# Patient Record
Sex: FEMALE | Race: OTHER | Employment: UNEMPLOYED | URBAN - METROPOLITAN AREA
[De-identification: names, ages, dates, MRNs, and addresses within clinical notes are randomized per-mention and may not be internally consistent; named-entity substitution may affect disease eponyms.]

---
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
## 2022-07-21 ENCOUNTER — OFFICE VISIT (OUTPATIENT)
Dept: FAMILY MEDICINE CLINIC | Facility: CLINIC | Age: 22
End: 2022-07-21
Payer: COMMERCIAL

## 2022-07-21 VITALS
TEMPERATURE: 100 F | WEIGHT: 125 LBS | HEART RATE: 108 BPM | RESPIRATION RATE: 16 BRPM | HEIGHT: 61.02 IN | OXYGEN SATURATION: 98 % | BODY MASS INDEX: 23.6 KG/M2 | SYSTOLIC BLOOD PRESSURE: 118 MMHG | DIASTOLIC BLOOD PRESSURE: 70 MMHG

## 2022-07-21 DIAGNOSIS — Z11.8 SCREENING FOR CHLAMYDIAL DISEASE: ICD-10-CM

## 2022-07-21 DIAGNOSIS — Z83.438 FAMILY HISTORY OF HYPERLIPIDEMIA: ICD-10-CM

## 2022-07-21 DIAGNOSIS — Z30.09 BIRTH CONTROL COUNSELING: ICD-10-CM

## 2022-07-21 DIAGNOSIS — Z00.00 ANNUAL PHYSICAL EXAM: Primary | ICD-10-CM

## 2022-07-21 DIAGNOSIS — F32.A MILD DEPRESSION: ICD-10-CM

## 2022-07-21 PROBLEM — F43.21 ADJUSTMENT DISORDER WITH DEPRESSED MOOD: Status: RESOLVED | Noted: 2021-08-03 | Resolved: 2022-07-21

## 2022-07-21 PROCEDURE — 87591 N.GONORRHOEAE DNA AMP PROB: CPT | Performed by: FAMILY MEDICINE

## 2022-07-21 PROCEDURE — 87491 CHLMYD TRACH DNA AMP PROBE: CPT | Performed by: FAMILY MEDICINE

## 2022-07-21 PROCEDURE — 99395 PREV VISIT EST AGE 18-39: CPT | Performed by: FAMILY MEDICINE

## 2022-07-21 PROCEDURE — 3078F DIAST BP <80 MM HG: CPT | Performed by: FAMILY MEDICINE

## 2022-07-21 PROCEDURE — 3074F SYST BP LT 130 MM HG: CPT | Performed by: FAMILY MEDICINE

## 2022-07-21 PROCEDURE — 3008F BODY MASS INDEX DOCD: CPT | Performed by: FAMILY MEDICINE

## 2022-07-21 RX ORDER — DESOGESTREL AND ETHINYL ESTRADIOL 21-5 (28)
KIT ORAL
COMMUNITY
Start: 2022-02-13

## 2022-07-22 LAB
C TRACH DNA SPEC QL NAA+PROBE: NEGATIVE
N GONORRHOEA DNA SPEC QL NAA+PROBE: NEGATIVE

## 2022-07-27 ENCOUNTER — NURSE ONLY (OUTPATIENT)
Dept: FAMILY MEDICINE CLINIC | Facility: CLINIC | Age: 22
End: 2022-07-27
Payer: COMMERCIAL

## 2022-07-27 DIAGNOSIS — Z83.438 FAMILY HISTORY OF HYPERLIPIDEMIA: ICD-10-CM

## 2022-07-27 DIAGNOSIS — F32.A MILD DEPRESSION: ICD-10-CM

## 2022-07-27 LAB
CHOLEST SERPL-MCNC: 174 MG/DL (ref ?–200)
FASTING PATIENT LIPID ANSWER: YES
HDLC SERPL-MCNC: 65 MG/DL (ref 40–59)
LDLC SERPL CALC-MCNC: 93 MG/DL (ref ?–100)
NONHDLC SERPL-MCNC: 109 MG/DL (ref ?–130)
T4 FREE SERPL-MCNC: 1.2 NG/DL (ref 0.9–1.6)
TRIGL SERPL-MCNC: 86 MG/DL (ref 30–149)
TSI SER-ACNC: 2.05 MIU/ML (ref 0.36–3.74)
VLDLC SERPL CALC-MCNC: 14 MG/DL (ref 0–30)

## 2022-07-27 PROCEDURE — 84443 ASSAY THYROID STIM HORMONE: CPT | Performed by: FAMILY MEDICINE

## 2022-07-27 PROCEDURE — 80061 LIPID PANEL: CPT | Performed by: FAMILY MEDICINE

## 2022-07-27 PROCEDURE — 36415 COLL VENOUS BLD VENIPUNCTURE: CPT | Performed by: FAMILY MEDICINE

## 2022-07-27 PROCEDURE — 84439 ASSAY OF FREE THYROXINE: CPT | Performed by: FAMILY MEDICINE

## 2022-07-27 NOTE — PROGRESS NOTES
Patient came in for draw of ordered fasting labs. Patient drawn out of right AC, x 2 attempt and tolerated well.  1 gold (labeled ltgrn) tube drawn.      Unable to collect lav patient sent to reference lab for non-fasting lab

## 2022-08-11 ENCOUNTER — LAB ENCOUNTER (OUTPATIENT)
Dept: LAB | Age: 22
End: 2022-08-11
Attending: FAMILY MEDICINE
Payer: COMMERCIAL

## 2022-08-11 LAB
BASOPHILS # BLD AUTO: 0.05 X10(3) UL (ref 0–0.2)
BASOPHILS NFR BLD AUTO: 0.5 %
EOSINOPHIL # BLD AUTO: 0.25 X10(3) UL (ref 0–0.7)
EOSINOPHIL NFR BLD AUTO: 2.7 %
ERYTHROCYTE [DISTWIDTH] IN BLOOD BY AUTOMATED COUNT: 11.6 %
HCT VFR BLD AUTO: 39.9 %
HGB BLD-MCNC: 13.4 G/DL
IMM GRANULOCYTES # BLD AUTO: 0.01 X10(3) UL (ref 0–1)
IMM GRANULOCYTES NFR BLD: 0.1 %
LYMPHOCYTES # BLD AUTO: 3.19 X10(3) UL (ref 1.5–5)
LYMPHOCYTES NFR BLD AUTO: 34.4 %
MCH RBC QN AUTO: 31.2 PG (ref 26–34)
MCHC RBC AUTO-ENTMCNC: 33.6 G/DL (ref 31–37)
MCV RBC AUTO: 93 FL
MONOCYTES # BLD AUTO: 0.47 X10(3) UL (ref 0.1–1)
MONOCYTES NFR BLD AUTO: 5.1 %
NEUTROPHILS # BLD AUTO: 5.3 X10 (3) UL (ref 1.5–7.7)
NEUTROPHILS # BLD AUTO: 5.3 X10(3) UL (ref 1.5–7.7)
NEUTROPHILS NFR BLD AUTO: 57.2 %
PLATELET # BLD AUTO: 325 10(3)UL (ref 150–450)
RBC # BLD AUTO: 4.29 X10(6)UL
WBC # BLD AUTO: 9.3 X10(3) UL (ref 4–11)

## 2022-08-11 PROCEDURE — 85025 COMPLETE CBC W/AUTO DIFF WBC: CPT | Performed by: FAMILY MEDICINE

## 2023-08-01 ENCOUNTER — OFFICE VISIT (OUTPATIENT)
Dept: FAMILY MEDICINE CLINIC | Facility: CLINIC | Age: 23
End: 2023-08-01
Payer: COMMERCIAL

## 2023-08-01 VITALS
RESPIRATION RATE: 20 BRPM | DIASTOLIC BLOOD PRESSURE: 70 MMHG | SYSTOLIC BLOOD PRESSURE: 116 MMHG | BODY MASS INDEX: 25.65 KG/M2 | OXYGEN SATURATION: 100 % | WEIGHT: 132.38 LBS | TEMPERATURE: 97 F | HEIGHT: 60.43 IN | HEART RATE: 103 BPM

## 2023-08-01 DIAGNOSIS — Z30.41 ENCOUNTER FOR SURVEILLANCE OF CONTRACEPTIVE PILLS: ICD-10-CM

## 2023-08-01 DIAGNOSIS — Z00.00 ANNUAL PHYSICAL EXAM: Primary | ICD-10-CM

## 2023-08-01 DIAGNOSIS — Z11.8 SCREENING FOR CHLAMYDIAL DISEASE: ICD-10-CM

## 2023-08-01 PROBLEM — F32.A MILD DEPRESSION: Status: RESOLVED | Noted: 2022-07-21 | Resolved: 2023-08-01

## 2023-08-01 LAB
CONTROL LINE PRESENT WITH A CLEAR BACKGROUND (YES/NO): YES YES/NO
KIT LOT #: NORMAL NUMERIC
PREGNANCY TEST, URINE: NEGATIVE

## 2023-08-01 PROCEDURE — 99395 PREV VISIT EST AGE 18-39: CPT | Performed by: FAMILY MEDICINE

## 2023-08-01 PROCEDURE — 87491 CHLMYD TRACH DNA AMP PROBE: CPT | Performed by: FAMILY MEDICINE

## 2023-08-01 PROCEDURE — 3074F SYST BP LT 130 MM HG: CPT | Performed by: FAMILY MEDICINE

## 2023-08-01 PROCEDURE — 3008F BODY MASS INDEX DOCD: CPT | Performed by: FAMILY MEDICINE

## 2023-08-01 PROCEDURE — 81025 URINE PREGNANCY TEST: CPT | Performed by: FAMILY MEDICINE

## 2023-08-01 PROCEDURE — 87591 N.GONORRHOEAE DNA AMP PROB: CPT | Performed by: FAMILY MEDICINE

## 2023-08-01 PROCEDURE — 3078F DIAST BP <80 MM HG: CPT | Performed by: FAMILY MEDICINE

## 2023-08-01 RX ORDER — DESOGESTREL AND ETHINYL ESTRADIOL 21-5 (28)
1 KIT ORAL DAILY
Qty: 84 TABLET | Refills: 0 | COMMUNITY
Start: 2023-08-01

## 2023-08-02 LAB
C TRACH DNA SPEC QL NAA+PROBE: NEGATIVE
N GONORRHOEA DNA SPEC QL NAA+PROBE: NEGATIVE

## 2024-01-03 ENCOUNTER — MED REC SCAN ONLY (OUTPATIENT)
Dept: FAMILY MEDICINE CLINIC | Facility: CLINIC | Age: 24
End: 2024-01-03

## 2024-03-04 ENCOUNTER — OFFICE VISIT (OUTPATIENT)
Dept: FAMILY MEDICINE CLINIC | Facility: CLINIC | Age: 24
End: 2024-03-04
Payer: COMMERCIAL

## 2024-03-04 VITALS
DIASTOLIC BLOOD PRESSURE: 60 MMHG | WEIGHT: 144.63 LBS | RESPIRATION RATE: 20 BRPM | OXYGEN SATURATION: 99 % | TEMPERATURE: 97 F | BODY MASS INDEX: 28.39 KG/M2 | HEIGHT: 60 IN | HEART RATE: 91 BPM | SYSTOLIC BLOOD PRESSURE: 108 MMHG

## 2024-03-04 DIAGNOSIS — H61.21 IMPACTED CERUMEN OF RIGHT EAR: ICD-10-CM

## 2024-03-04 DIAGNOSIS — H60.541 ACUTE ECZEMATOID OTITIS EXTERNA OF RIGHT EAR: Primary | ICD-10-CM

## 2024-03-04 DIAGNOSIS — R51.9 NEW ONSET OF HEADACHES: ICD-10-CM

## 2024-03-04 RX ORDER — NEOMYCIN SULFATE, POLYMYXIN B SULFATE AND HYDROCORTISONE 10; 3.5; 1 MG/ML; MG/ML; [USP'U]/ML
3 SUSPENSION/ DROPS AURICULAR (OTIC) 3 TIMES DAILY
Qty: 10 ML | Refills: 0 | Status: SHIPPED | OUTPATIENT
Start: 2024-03-04

## 2024-03-04 RX ORDER — AMITRIPTYLINE HYDROCHLORIDE 25 MG/1
25 TABLET, FILM COATED ORAL NIGHTLY
Qty: 30 TABLET | Refills: 1 | Status: SHIPPED | OUTPATIENT
Start: 2024-03-04

## 2024-03-04 NOTE — PROGRESS NOTES
CHIEF COMPLAINT:   Chief Complaint   Patient presents with    Ear Pain     Patient c/o of right ear pain        HPI:     Adalgisa Duque is a 23 year old female hx of eczema presents for right ear discomfort and itching with slight decreased hearing x 2 months.  No drainage, denies dizziness.  Denies any nasal congestion or runny nose.  Denies any fever or chills.    Complains of left sided headache almost daily for 2 months.  Headaches are about a 3-4 out of 10 on the pain scale, described as a tightening feeling started in the temple area and go all the way back up to her head.  They can last 8 or 9 hours and resolve with sleep.  She does not take any medication for the headaches.  She denies any prodrome or scotoma or vision changes preceding the headache.  Denies any associated nausea or vomiting, photophobia or phonophobia.  Headaches are not waking her up at night.  She has not had these headaches before.  Denies family history of migraine.  Denies any nasal congestion, runny nose, weakness in the arms or legs, numbness tingling in the arms or legs.  No associated fevers.  Denies neck pain.  Headaches usually start in the afternoon sometimes go away on their own other times goes to bed and wakes up without a headache.  Does not drink caffeine or energy drinks with caffeine.  Does not drink soda, coffee or tea.      HISTORY:  Past Medical History:   Diagnosis Date    Patient denies medical problems       Past Surgical History:   Procedure Laterality Date    ORAL SURGERY PROCEDURE      widsom tooth 06/2021       Family History   Problem Relation Age of Onset    Thyroid disease Mother     Asthma Father     Diabetes Maternal Grandmother     Glaucoma Maternal Grandmother     Hypertension Maternal Grandmother     Diabetes Paternal Grandmother     Diabetes Paternal Grandfather       Social History:   Social History     Socioeconomic History    Marital status: Single   Tobacco Use    Smoking status: Never     Smokeless tobacco: Never   Vaping Use    Vaping Use: Never used   Substance and Sexual Activity    Alcohol use: Yes     Comment: Drinks probably less than once a month    Drug use: Never   Other Topics Concern    Caffeine Concern No    Exercise No    Seat Belt No    Special Diet No    Stress Concern No    Weight Concern No        Medications (Active prior to today's visit):  Current Outpatient Medications   Medication Sig Dispense Refill    amitriptyline 25 MG Oral Tab Take 1 tablet (25 mg total) by mouth nightly. 30 tablet 1    neomycin-polymyxin-hydrocortisone 3.5-14536-7 Otic Suspension Place 3 drops into the right ear 3 (three) times daily. 10 mL 0    Desogestrel-Ethinyl Estradiol 0.15-0.02/0.01 MG (21/5) Oral Tab Take 1 tablet by mouth daily. 84 tablet 0    Multiple Vitamins-Minerals (CENTRUM) Oral Tab Take 1 tablet by mouth daily.         Allergies:  Allergies   Allergen Reactions    [place] ITCHING, PAIN, RASH and SWELLING       PSFH elements reviewed from today and agreed except as otherwise stated in HPI.  PHYSICAL EXAM:   /60 (BP Location: Right arm, Patient Position: Sitting, Cuff Size: adult)   Pulse 91   Temp 97.2 °F (36.2 °C) (Temporal)   Resp 20   Ht 5' (1.524 m)   Wt 144 lb 9.6 oz (65.6 kg)   LMP 02/09/2024 (Exact Date)   SpO2 99%   BMI 28.24 kg/m²   Vital signs reviewed.Appears stated age, well groomed.  Physical Exam     GENERAL: well developed, well nourished,in no apparent distress  EYES; PERRLA, EOM-i B/L. Sclera clear and non icteric bilateral  SKIN: no rashes,no suspicious lesions  HEENT: atraumatic, normocephalic, no papilledema bilateral, normal funduscopic exam, normal red reflex, PERRL EOMI bilateral.  Left ear TM is intact and clear, right ear is occluded by cerumen which is very dry.  NECK: supple,no adenopathy,no bruits, no JVD  LUNGS: clear to auscultation bilateral. No RRW. Good inspiratory and expiratory effort  CARDIO: RRR without murmur, clear S1, S2  VS: no  carotid artery bruit bilateral.    GI: good BS's,no masses,No HSM or tenderness.   EXTREMITIES: no cyanosis, clubbing or edema, bilateral. No calf tenderness  NEURO: no focal deficits. AOx3.PERRL EOMI bilateral. CN II-XII grossly intact. NL gait. Normal u/LE bilateral DTR +2/4 and symmetric. Normal sensation to touch intact.Normal finger to nose test. Normal muscle strength +5/5 b/l and equal and symmetric of upper/lower extremity. No pronator drift.     EAR WAX REMOVAL  Lighted curette was used to remove earwax from the right ear.  Post earwax removal posterior and inferior wall of the EAC was very dry, erythematous with some cracking and the EAC opening had dry scaly skin.  Patient tolerated procedure well and denied pain  right cerumen impaction resolved- affected TM intact and clear      LABS        REVIEWED THIS VISIT  ASSESSMENT/PLAN:   23 year old female with    1. Acute eczematoid otitis externa of right ear  - neomycin-polymyxin-hydrocortisone 3.5-43001-8 Otic Suspension; Place 3 drops into the right ear 3 (three) times daily.  Dispense: 10 mL; Refill: 0  Patient prone to eczema  At risk of outer ear infection with flare in the EAC   Topical steroid and antibiotic drops 3 times daily for a week  Patient is to keep the ear dry and shower wear a cotton ball    2. Impacted cerumen of right ear  Resolved with curette    3. New onset of headaches  - start amitriptyline 25 MG Oral Tab; Take 1 tablet (25 mg total) by mouth nightly.  Dispense: 30 tablet; Refill: 1  Neuroexam is normal  Headaches x 2 months  Suspect chronic daily headache  Has good sleep hygiene, not abusing or using any caffeine, onset taking any NSAIDs to have rebound headaches.  Start with low-dose amitriptyline-if 25 mg is too sedating may take half a tablet.  Risks and benefits of medication amitriptyline discussed including headache, bloating, constipation, dry mouth, hypertension, heart arrhythmia, dizziness, nausea, urinary retention, weight  gain feeling fatigued or sedated on medication,-can have hangover effect, constipation, dizziness, weight gain, developing depression, severe depression,, not feeling like oneself etc.  No driving or operating machinery for 8 hours after ingestion.  Black box warning of becoming suicidal related to medications discussed.  Call office immediately if any moderate to severe side effects or concerns.  Encourage patient to call office if any concerns or questions with the medication or ear issues  Recheck in 1 month if headaches are not resolving or if are worsening or if neuroexam changes then needs MRI of the brain.  Follow-up closely      Meds This Visit:  Requested Prescriptions     Signed Prescriptions Disp Refills    amitriptyline 25 MG Oral Tab 30 tablet 1     Sig: Take 1 tablet (25 mg total) by mouth nightly.    neomycin-polymyxin-hydrocortisone 3.5-86859-1 Otic Suspension 10 mL 0     Sig: Place 3 drops into the right ear 3 (three) times daily.       Health Maintenance:  Health Maintenance   Topic Date Due    Chlamydia Screening  08/01/2024    Annual Physical  08/01/2024    Pap Smear  12/19/2026    DTaP,Tdap,and Td Vaccines (8 - Td or Tdap) 08/03/2031    Influenza Vaccine  Completed    Annual Depression Screening  Completed    Hepatitis B Vaccines  Completed    Hepatitis A Vaccines  Completed    MMR Vaccines  Completed    Varicella Vaccines  Completed    Meningococcal Vaccine  Completed    HPV Vaccines  Completed    COVID-19 Vaccine  Completed    Pneumococcal Vaccine: Birth to 64yrs  Aged Out         Patient/Caregiver Education: Patient/Caregiver Education: There are no barriers to learning. Medical education done.   Outcome: Patient verbalizes understanding. Patient is notified to call with any questions, comp lications, allergies, or worsening or changing symptoms.  Patient is to call with any side effects or complications from the treatments as a result of today.     Problem List:     Patient Active Problem  List   Diagnosis    Intrinsic atopic dermatitis    Family history of breast cancer    Immunity status testing    Family history of hyperlipidemia    BMI 25.0-25.9,adult    Impacted cerumen of right ear    Acute eczematoid otitis externa of right ear    New onset of headaches       Imaging & Referrals:  None     3/4/2024  Deyanira Somers, DO      Patient understands plan and follow-up.  Return in about 1 month (around 4/4/2024) for recheck symptoms, sooner if needed..

## 2024-03-27 DIAGNOSIS — R51.9 NEW ONSET OF HEADACHES: ICD-10-CM

## 2024-03-27 RX ORDER — AMITRIPTYLINE HYDROCHLORIDE 25 MG/1
25 TABLET, FILM COATED ORAL NIGHTLY
Qty: 90 TABLET | Refills: 1 | OUTPATIENT
Start: 2024-03-27

## 2024-04-05 ENCOUNTER — OFFICE VISIT (OUTPATIENT)
Dept: FAMILY MEDICINE CLINIC | Facility: CLINIC | Age: 24
End: 2024-04-05
Payer: COMMERCIAL

## 2024-04-05 VITALS
BODY MASS INDEX: 29.02 KG/M2 | HEART RATE: 105 BPM | DIASTOLIC BLOOD PRESSURE: 70 MMHG | HEIGHT: 60 IN | WEIGHT: 147.81 LBS | SYSTOLIC BLOOD PRESSURE: 100 MMHG | TEMPERATURE: 97 F | OXYGEN SATURATION: 99 % | RESPIRATION RATE: 20 BRPM

## 2024-04-05 DIAGNOSIS — F41.9 ANXIETY AND DEPRESSION: ICD-10-CM

## 2024-04-05 DIAGNOSIS — R51.9 NEW ONSET HEADACHE: Primary | ICD-10-CM

## 2024-04-05 DIAGNOSIS — N91.2 AMENORRHEA: ICD-10-CM

## 2024-04-05 DIAGNOSIS — F32.A ANXIETY AND DEPRESSION: ICD-10-CM

## 2024-04-05 DIAGNOSIS — F43.9 STRESS: ICD-10-CM

## 2024-04-05 PROCEDURE — 81025 URINE PREGNANCY TEST: CPT | Performed by: FAMILY MEDICINE

## 2024-04-05 PROCEDURE — 3078F DIAST BP <80 MM HG: CPT | Performed by: FAMILY MEDICINE

## 2024-04-05 PROCEDURE — 99214 OFFICE O/P EST MOD 30 MIN: CPT | Performed by: FAMILY MEDICINE

## 2024-04-05 PROCEDURE — 3074F SYST BP LT 130 MM HG: CPT | Performed by: FAMILY MEDICINE

## 2024-04-05 PROCEDURE — 3008F BODY MASS INDEX DOCD: CPT | Performed by: FAMILY MEDICINE

## 2024-04-05 RX ORDER — AMITRIPTYLINE HYDROCHLORIDE 25 MG/1
25 TABLET, FILM COATED ORAL NIGHTLY
Qty: 30 TABLET | Refills: 5 | Status: SHIPPED | OUTPATIENT
Start: 2024-04-05

## 2024-04-05 NOTE — PROGRESS NOTES
CHIEF COMPLAINT:   Chief Complaint   Patient presents with    Medication Follow-Up       HPI:     Adalgisa Duque is a 23 year old female follow-up new onset headaches daily to every other day.  Last visit patient was started on amitriptyline 25 mg at bedtime.  She states after the visit she was not in school and then did not have a headache for 2 weeks but when she restarted the semester she started having headaches every day to every other day.  She started taking amitriptyline and after 3 days the headaches resolved.  She had another headache and 1 day that she forgot to take the amitriptyline and she is only had 1 headache after taking it consistently.  States it was mild did not need any medication.  She does have some sedation but sleeps well with the medication.  Denies any other medication side effects.  Denies weakness or numbness of her arms or legs.  Denies any gait disturbances, dizziness or vision changes.    Has history of depression and anxiety-seeing counselor weekly to every 2 weeks.  Does not want to be on medication.  She feels it is due to her stress and circumstances.  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  She does feel down and sad several days a week.  She feels that she is managing it.  Her appetite is normal.  Sleep is unaffected.  She denies any SI or HI or wanting to die.  She is hopeful but scared due to the pressure she is under.  Feels angry at times that she has no time for herself.    Amenorrhea-no menses for 2 months on OCPs.  Not missed any doses of ACP Home pregnancy test have been negative.  Patient was concerned if she should have a menstrual cycle.  OCPs are filled by a practitioner at her school.    HPI 3/4/2024  hx of eczema presents for right ear discomfort and itching with slight decreased hearing x 2 months.  No drainage, denies dizziness.  Denies any nasal  congestion or runny nose.  Denies any fever or chills.    Complains of left sided headache almost daily for 2 months.  Headaches are about a 3-4 out of 10 on the pain scale, described as a tightening feeling started in the temple area and go all the way back up to her head.  They can last 8 or 9 hours and resolve with sleep.  She does not take any medication for the headaches.  She denies any prodrome or scotoma or vision changes preceding the headache.  Denies any associated nausea or vomiting, photophobia or phonophobia.  Headaches are not waking her up at night.  She has not had these headaches before.  Denies family history of migraine.  Denies any nasal congestion, runny nose, weakness in the arms or legs, numbness tingling in the arms or legs.  No associated fevers.  Denies neck pain.  Headaches usually start in the afternoon sometimes go away on their own other times goes to bed and wakes up without a headache.  Does not drink caffeine or energy drinks with caffeine.  Does not drink soda, coffee or tea.      HISTORY:  Past Medical History:   Diagnosis Date    Patient denies medical problems       Past Surgical History:   Procedure Laterality Date    ORAL SURGERY PROCEDURE      widsom tooth 06/2021       Family History   Problem Relation Age of Onset    Thyroid disease Mother     Asthma Father     Diabetes Maternal Grandmother     Glaucoma Maternal Grandmother     Hypertension Maternal Grandmother     Diabetes Paternal Grandmother     Diabetes Paternal Grandfather       Social History:   Social History     Socioeconomic History    Marital status: Single   Tobacco Use    Smoking status: Never    Smokeless tobacco: Never   Vaping Use    Vaping Use: Never used   Substance and Sexual Activity    Alcohol use: Yes     Comment: Drinks probably less than once a month    Drug use: Never   Other Topics Concern    Caffeine Concern No    Exercise No    Seat Belt No    Special Diet No    Stress Concern No    Weight Concern  No        Medications (Active prior to today's visit):  Current Outpatient Medications   Medication Sig Dispense Refill    amitriptyline 25 MG Oral Tab Take 1 tablet (25 mg total) by mouth nightly. 30 tablet 1    neomycin-polymyxin-hydrocortisone 3.5-81040-7 Otic Suspension Place 3 drops into the right ear 3 (three) times daily. 10 mL 0    Desogestrel-Ethinyl Estradiol 0.15-0.02/0.01 MG (21/5) Oral Tab Take 1 tablet by mouth daily. 84 tablet 0    Multiple Vitamins-Minerals (CENTRUM) Oral Tab Take 1 tablet by mouth daily.         Allergies:  Allergies   Allergen Reactions    [place] ITCHING, PAIN, RASH and SWELLING       PSFH elements reviewed from today and agreed except as otherwise stated in HPI.  PHYSICAL EXAM:   /70 (BP Location: Left arm, Patient Position: Sitting, Cuff Size: adult)   Pulse 105   Temp 97 °F (36.1 °C) (Temporal)   Resp 20   Ht 5' (1.524 m)   Wt 147 lb 12.8 oz (67 kg)   LMP 02/09/2024 (Exact Date)   SpO2 99%   BMI 28.87 kg/m²   Vital signs reviewed.Appears stated age, well groomed.  Physical Exam   GENERAL: well developed, well nourished,in no apparent distress  EYES; PERRLA, EOM-i B/L. Sclera clear and non icteric bilateral  SKIN: no rashes,no suspicious lesions  HEENT: atraumatic, normocephalic, no papilledema bilateral, normal funduscopic exam, normal red reflex, PERRL EOMI bilateral.    NECK: supple,no adenopathy,no bruits, no JVD  LUNGS: clear to auscultation bilateral. No RRW. Good inspiratory and expiratory effort  CARDIO: RRR without murmur, clear S1, S2  VS: no carotid artery bruit bilateral.    GI: good BS's,no masses,No HSM or tenderness.   EXTREMITIES: no cyanosis, clubbing or edema, bilateral. No calf tenderness  NEURO: no focal deficits. AOx3.PERRL EOMI bilateral. CN II-XII grossly intact. NL gait. Normal u/LE bilateral DTR +2/4 and symmetric. Normal sensation to touch intact.Normal finger to nose test. Normal muscle strength +5/5 b/l and equal and symmetric of  upper/lower extremity. No pronator drift.   Psyche: normal affect.  Good eye contact.  Well-groomed.  no tearfulness and crying.  Normal speech.  Appropriate insight.  Denies suicidal or homicidal ideation.       LABS        REVIEWED THIS VISIT  ASSESSMENT/PLAN:   23 year old female with    1. New onset headache  - z Insight MRI BRAIN (CPT=70551); Future  - z Insight MRI BRAIN (CPT=70551)  Appears to be responding to amitriptyline neuroexam is normal.  Suspect chronic daily headache and tension due to stress  Will rule out any other etiology since now with amenorrhea  Continue amitriptyline  Follow-up in June 2024 off medication to see if headaches return    2. Stress  - amitriptyline 25 MG Oral Tab; Take 1 tablet (25 mg total) by mouth nightly.  Dispense: 30 tablet; Refill: 5  Discussed stress management  Take at least 1 evening off a week to rest or do something she enjoys  Continue getting adequate rest  Walk 25 minutes outside daily will improve mood    3. Amenorrhea  - Urine Preg Test-negative  - z Insight MRI BRAIN (CPT=70551); Future  - z Insight MRI BRAIN (CPT=70551)  Stop OCPs and within a few's weeks to months will have it menstrual cycle but patient declined  Reassured patient not serious and patient does not have a menstrual cycle as long as she is not pregnant    4. Anxiety and depression  Continue psychotherapy  Feels able to manage her studies and responsibilities and mood is not affecting her quality of life  Understands if symptoms worsen or change she can always return to the office to discuss antidepressants.  Patient is on a very low-dose of amitriptyline which may have some antidepressant effect but may not be sufficient if symptoms worsen  -contracts for safety- if suicidal or homicidal, call 911 or go to nearest ER and call office  -increased suicide risk on amitriptyline  Exercise 3 x weekly x 30-60min  Recheck in June 2024-2 and half months      Meds This Visit:  Requested Prescriptions      Signed Prescriptions Disp Refills    amitriptyline 25 MG Oral Tab 30 tablet 5     Sig: Take 1 tablet (25 mg total) by mouth nightly.       Health Maintenance:  Health Maintenance   Topic Date Due    Chlamydia Screening  08/01/2024    Annual Physical  08/01/2024    Pap Smear  12/19/2026    DTaP,Tdap,and Td Vaccines (8 - Td or Tdap) 08/03/2031    Influenza Vaccine  Completed    Annual Depression Screening  Completed    Hepatitis B Vaccines  Completed    Hepatitis A Vaccines  Completed    MMR Vaccines  Completed    Varicella Vaccines  Completed    Meningococcal Vaccine  Completed    HPV Vaccines  Completed    COVID-19 Vaccine  Completed    Pneumococcal Vaccine: Birth to 64yrs  Aged Out         Patient/Caregiver Education: Patient/Caregiver Education: There are no barriers to learning. Medical education done.   Outcome: Patient verbalizes understanding. Patient is notified to call with any questions, comp lications, allergies, or worsening or changing symptoms.  Patient is to call with any side effects or complications from the treatments as a result of today.     Problem List:     Patient Active Problem List   Diagnosis    Intrinsic atopic dermatitis    Family history of breast cancer    Immunity status testing    Family history of hyperlipidemia    BMI 25.0-25.9,adult    Impacted cerumen of right ear    Acute eczematoid otitis externa of right ear    New onset of headaches       Imaging & Referrals:  None     3/4/2024  Deyanira Somers, DO      Patient understands plan and follow-up.  Return in about 2 months (around 6/18/2024) for recheck headaches. stop medication after finals in May 2024 sooner if worse.

## 2024-06-18 ENCOUNTER — OFFICE VISIT (OUTPATIENT)
Dept: FAMILY MEDICINE CLINIC | Facility: CLINIC | Age: 24
End: 2024-06-18

## 2024-06-18 VITALS
HEART RATE: 93 BPM | OXYGEN SATURATION: 99 % | RESPIRATION RATE: 22 BRPM | BODY MASS INDEX: 28.54 KG/M2 | WEIGHT: 145.38 LBS | SYSTOLIC BLOOD PRESSURE: 100 MMHG | TEMPERATURE: 97 F | HEIGHT: 60 IN | DIASTOLIC BLOOD PRESSURE: 70 MMHG

## 2024-06-18 DIAGNOSIS — G44.209 TENSION HEADACHE: Primary | ICD-10-CM

## 2024-06-18 PROCEDURE — 3008F BODY MASS INDEX DOCD: CPT | Performed by: FAMILY MEDICINE

## 2024-06-18 PROCEDURE — 99212 OFFICE O/P EST SF 10 MIN: CPT | Performed by: FAMILY MEDICINE

## 2024-06-18 PROCEDURE — 3078F DIAST BP <80 MM HG: CPT | Performed by: FAMILY MEDICINE

## 2024-06-18 PROCEDURE — 3074F SYST BP LT 130 MM HG: CPT | Performed by: FAMILY MEDICINE

## 2024-06-18 NOTE — PROGRESS NOTES
CHIEF COMPLAINT:   Chief Complaint   Patient presents with    Medication Follow-Up       HPI:     Adalgisa Duque is a 23 year old female presents for medication monitoring.  Patient self discontinued amitriptyline.  States finished school did not feel she needed it.  She has only had 4 headaches a month or less since school finished.  Last headache was a week ago.  States pressure at the top of the head and the left side of the head.  Relieved without any intervention or will sometimes use over-the-counter ibuprofen.  She denies feeling depressed, sad or hopeless.  Has therapist at school but is currently not in psychotherapy.  Working on research this summer.  Denies any associated vision changes, aura, nausea, vomiting, photophobia, phonophobia or severe headaches.  MRI was performed due to frequent daily headaches on 4/19/2024 and was normal.  Patient does not feel she needs any intervention at this time would like to be monitored or observed.    On OCP.  Menses is now regular.      HPI 4/5/2024  female follow-up new onset headaches daily to every other day.  Last visit patient was started on amitriptyline 25 mg at bedtime.  She states after the visit she was not in school and then did not have a headache for 2 weeks but when she restarted the semester she started having headaches every day to every other day.  She started taking amitriptyline and after 3 days the headaches resolved.  She had another headache and 1 day that she forgot to take the amitriptyline and she is only had 1 headache after taking it consistently.  States it was mild did not need any medication.  She does have some sedation but sleeps well with the medication.  Denies any other medication side effects.  Denies weakness or numbness of her arms or legs.  Denies any gait disturbances, dizziness or vision changes.    Has history of depression and anxiety-seeing counselor weekly to every 2 weeks.  Does not want to be on medication.   She feels it is due to her stress and circumstances.  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  She does feel down and sad several days a week.  She feels that she is managing it.  Her appetite is normal.  Sleep is unaffected.  She denies any SI or HI or wanting to die.  She is hopeful but scared due to the pressure she is under.  Feels angry at times that she has no time for herself.    Amenorrhea-no menses for 2 months on OCPs.  Not missed any doses of ACP Home pregnancy test have been negative.  Patient was concerned if she should have a menstrual cycle.  OCPs are filled by a practitioner at her school.    HPI 3/4/2024  hx of eczema presents for right ear discomfort and itching with slight decreased hearing x 2 months.  No drainage, denies dizziness.  Denies any nasal congestion or runny nose.  Denies any fever or chills.    Complains of left sided headache almost daily for 2 months.  Headaches are about a 3-4 out of 10 on the pain scale, described as a tightening feeling started in the temple area and go all the way back up to her head.  They can last 8 or 9 hours and resolve with sleep.  She does not take any medication for the headaches.  She denies any prodrome or scotoma or vision changes preceding the headache.  Denies any associated nausea or vomiting, photophobia or phonophobia.  Headaches are not waking her up at night.  She has not had these headaches before.  Denies family history of migraine.  Denies any nasal congestion, runny nose, weakness in the arms or legs, numbness tingling in the arms or legs.  No associated fevers.  Denies neck pain.  Headaches usually start in the afternoon sometimes go away on their own other times goes to bed and wakes up without a headache.  Does not drink caffeine or energy drinks with caffeine.  Does not drink soda, coffee or tea.      HISTORY:  Past Medical History:     Patient denies medical problems      Past Surgical History:   Procedure Laterality Date    Oral surgery procedure      widsom tooth 06/2021       Family History   Problem Relation Age of Onset    Thyroid disease Mother     Asthma Father     Diabetes Maternal Grandmother     Glaucoma Maternal Grandmother     Hypertension Maternal Grandmother     Diabetes Paternal Grandmother     Diabetes Paternal Grandfather       Social History:   Social History     Socioeconomic History    Marital status: Single   Tobacco Use    Smoking status: Never    Smokeless tobacco: Never   Vaping Use    Vaping status: Never Used   Substance and Sexual Activity    Alcohol use: Yes     Comment: Drinks probably less than once a month    Drug use: Never   Other Topics Concern    Caffeine Concern No    Exercise No    Seat Belt No    Special Diet No    Stress Concern No    Weight Concern No        Medications (Active prior to today's visit):  Current Outpatient Medications   Medication Sig Dispense Refill    amitriptyline 25 MG Oral Tab Take 1 tablet (25 mg total) by mouth nightly. 30 tablet 5    neomycin-polymyxin-hydrocortisone 3.5-30093-4 Otic Suspension Place 3 drops into the right ear 3 (three) times daily. 10 mL 0    Desogestrel-Ethinyl Estradiol 0.15-0.02/0.01 MG (21/5) Oral Tab Take 1 tablet by mouth daily. 84 tablet 0    Multiple Vitamins-Minerals (CENTRUM) Oral Tab Take 1 tablet by mouth daily.         Allergies:  Allergies   Allergen Reactions    [place] ITCHING, PAIN, RASH and SWELLING       PSFH elements reviewed from today and agreed except as otherwise stated in HPI.  PHYSICAL EXAM:   /70 (BP Location: Left arm, Patient Position: Sitting, Cuff Size: adult)   Pulse 93   Temp 97.2 °F (36.2 °C) (Temporal)   Resp 22   Ht 5' (1.524 m)   Wt 145 lb 6.4 oz (66 kg)   LMP 05/22/2024 (Exact Date)   SpO2 99%   BMI 28.40 kg/m²   Vital signs reviewed.Appears stated age, well groomed.  Physical Exam   GENERAL: well developed, well  nourished,in no apparent distress  EYES; PERRLA, EOM-i B/L. Sclera clear and non icteric bilateral  SKIN: no rashes,no suspicious lesions  HEENT: atraumatic, normocephalic, no papilledema bilateral, normal funduscopic exam, normal red reflex, PERRL EOMI bilateral.    LUNGS: clear to auscultation bilateral. No RRW. Good inspiratory and expiratory effort  CARDIO: RRR without murmur, clear S1, S2  EXTREMITIES: no cyanosis, clubbing or edema, bilateral. No calf tenderness  NEURO: no focal deficits. AOx3.PERRL EOMI bilateral. CN II-XII grossly intact. NL gait. Normal u/LE bilateral DTR +2/4 and symmetric. Normal sensation to touch intact.Normal finger to nose test. Normal muscle strength +5/5 b/l and equal and symmetric of upper/lower extremity. No pronator drift.   Psyche: normal affect.  Good eye contact.  Well-groomed.  no tearfulness and crying.  Normal speech.  Appropriate insight.  Denies suicidal or homicidal ideation.       LABS        Exam: MRI BRAIN W/O CONTRAST   CPT Code(s): 45966 - MRI BRAIN STEM W/O DYE     CLINICAL HISTORY: New onset headache (R 51.9). Amenorrhea (N 91.2).     COMPARISON: None.     TECHNIQUE: Brain MRI without contrast. Exam performed on an ultra high field 3.0 Anca MR scanner.     FINDINGS: The cerebral hemispheres, cerebellum, and brainstem are normal in morphology and signal. Normal morphology of the corpus callosum. No areas of restricted diffusion are identified. No evidence of edema, infarct, hemorrhage, mass, or abnormal   extra-axial fluid collection. Ventricular volumes are normal. No midline shift. The major intracranial arterial flow voids appear patent. Normal size of the pituitary gland. No Chiari malformation.     The paranasal sinuses and mastoid air cells are normally aerated. No bone lesions are identified.     IMPRESSION: Normal non-contrast brain MRI.     Interpreting Radiologist:     Sanjay Alvarenga M.D.   Electronically Signed: 04/19/2024 04:34 PM   REVIEWED THIS  VISIT  ASSESSMENT/PLAN:   23 year old female with    1. Tension headache  Off amitriptyline  Only a few mild headaches a month and if 1 occurs relieved with ibuprofen if more moderate  Neuroexam is normal  MRI of the brain is normal  Suspect headaches are triggered by stress possibly anxiety   If headache symptoms return when school restarts in the fall consider either restarting amitriptyline or considering an SSRI.    Meds This Visit:  Requested Prescriptions      No prescriptions requested or ordered in this encounter       Health Maintenance:  Health Maintenance   Topic Date Due    Chlamydia Screening  08/01/2024    Annual Physical  08/01/2024    Pap Smear  12/19/2026    DTaP,Tdap,and Td Vaccines (8 - Td or Tdap) 08/03/2031    Influenza Vaccine  Completed    Annual Depression Screening  Completed    Hepatitis B Vaccines  Completed    Hepatitis A Vaccines  Completed    MMR Vaccines  Completed    Varicella Vaccines  Completed    Meningococcal Vaccine  Completed    HPV Vaccines  Completed    COVID-19 Vaccine  Completed    Pneumococcal Vaccine: Birth to 64yrs  Aged Out         Patient/Caregiver Education: Patient/Caregiver Education: There are no barriers to learning. Medical education done.   Outcome: Patient verbalizes understanding. Patient is notified to call with any questions, comp lications, allergies, or worsening or changing symptoms.  Patient is to call with any side effects or complications from the treatments as a result of today.     Problem List:     Patient Active Problem List   Diagnosis    Intrinsic atopic dermatitis    Family history of breast cancer    Immunity status testing    Family history of hyperlipidemia    BMI 25.0-25.9,adult    Impacted cerumen of right ear    Acute eczematoid otitis externa of right ear    New onset headache    Stress    Amenorrhea    Anxiety and depression       Imaging & Referrals:  None     3/4/2024  Deyanira Somers, DO      Patient understands plan and  follow-up.  Return in about 8 weeks (around 8/13/2024) for annual physical, fasting labs AM, recheck symptoms.

## 2024-12-19 ENCOUNTER — OFFICE VISIT (OUTPATIENT)
Dept: FAMILY MEDICINE CLINIC | Facility: CLINIC | Age: 24
End: 2024-12-19
Payer: COMMERCIAL

## 2024-12-19 VITALS
WEIGHT: 145.63 LBS | BODY MASS INDEX: 26.8 KG/M2 | SYSTOLIC BLOOD PRESSURE: 108 MMHG | TEMPERATURE: 97 F | HEIGHT: 61.7 IN | OXYGEN SATURATION: 99 % | DIASTOLIC BLOOD PRESSURE: 64 MMHG | RESPIRATION RATE: 18 BRPM | HEART RATE: 86 BPM

## 2024-12-19 DIAGNOSIS — Z13.21 SCREENING FOR ENDOCRINE, NUTRITIONAL, METABOLIC AND IMMUNITY DISORDER: ICD-10-CM

## 2024-12-19 DIAGNOSIS — Z30.430 ENCOUNTER FOR INSERTION OF SKYLA IUD: ICD-10-CM

## 2024-12-19 DIAGNOSIS — F32.A MILD DEPRESSION: ICD-10-CM

## 2024-12-19 DIAGNOSIS — F41.9 ANXIETY: ICD-10-CM

## 2024-12-19 DIAGNOSIS — Z11.8 SCREENING FOR CHLAMYDIAL DISEASE: ICD-10-CM

## 2024-12-19 DIAGNOSIS — Z30.41 ENCOUNTER FOR SURVEILLANCE OF CONTRACEPTIVE PILLS: ICD-10-CM

## 2024-12-19 DIAGNOSIS — Z13.228 SCREENING FOR ENDOCRINE, NUTRITIONAL, METABOLIC AND IMMUNITY DISORDER: ICD-10-CM

## 2024-12-19 DIAGNOSIS — Z13.0 SCREENING FOR ENDOCRINE, NUTRITIONAL, METABOLIC AND IMMUNITY DISORDER: ICD-10-CM

## 2024-12-19 DIAGNOSIS — Z00.00 ANNUAL PHYSICAL EXAM: Primary | ICD-10-CM

## 2024-12-19 DIAGNOSIS — Z13.29 SCREENING FOR ENDOCRINE, NUTRITIONAL, METABOLIC AND IMMUNITY DISORDER: ICD-10-CM

## 2024-12-19 DIAGNOSIS — R45.851 SUICIDAL IDEATION: ICD-10-CM

## 2024-12-19 PROBLEM — N91.2 AMENORRHEA: Status: RESOLVED | Noted: 2024-04-05 | Resolved: 2024-12-19

## 2024-12-19 PROCEDURE — 87491 CHLMYD TRACH DNA AMP PROBE: CPT | Performed by: FAMILY MEDICINE

## 2024-12-19 PROCEDURE — 87591 N.GONORRHOEAE DNA AMP PROB: CPT | Performed by: FAMILY MEDICINE

## 2024-12-19 RX ORDER — TACROLIMUS 1 MG/G
1 OINTMENT TOPICAL
COMMUNITY
Start: 2024-08-21

## 2024-12-19 RX ORDER — ALCLOMETASONE DIPROPIONATE 0.5 MG/G
1 OINTMENT TOPICAL
COMMUNITY
Start: 2024-08-21

## 2024-12-19 RX ORDER — NORGESTIMATE AND ETHINYL ESTRADIOL 0.25-0.035
1 KIT ORAL DAILY
Qty: 84 TABLET | Refills: 3 | Status: SHIPPED | OUTPATIENT
Start: 2024-12-19 | End: 2024-12-19

## 2024-12-19 RX ORDER — NORGESTIMATE AND ETHINYL ESTRADIOL 0.25-0.035
1 KIT ORAL DAILY
Qty: 84 TABLET | Refills: 3 | Status: SHIPPED
Start: 2024-12-19 | End: 2024-12-19

## 2024-12-19 RX ORDER — NORGESTIMATE AND ETHINYL ESTRADIOL 0.25-0.035
1 KIT ORAL DAILY
Qty: 84 TABLET | Refills: 3 | Status: SHIPPED
Start: 2024-12-19

## 2024-12-19 RX ORDER — NORGESTIMATE AND ETHINYL ESTRADIOL 0.25-0.035
1 KIT ORAL DAILY
COMMUNITY
End: 2024-12-19

## 2024-12-19 RX ORDER — KETOCONAZOLE 20 MG/G
CREAM TOPICAL
COMMUNITY
Start: 2024-08-21

## 2024-12-19 RX ORDER — CLOBETASOL PROPIONATE 0.5 MG/ML
SOLUTION TOPICAL
COMMUNITY
Start: 2024-08-21

## 2024-12-19 NOTE — PROGRESS NOTES
REASON FOR VISIT:    Adalgisa Duque is a 24 year old female who presents for an Annual Health Assessment.    No complaints-     Studying history- applying for law school/prelaw. Completed [employer]-grade ok. 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 Wants to leave [place]  Feeling depressed just finished finals and feels due to stress. Felt depressed at times and thought of dying to end stress, no plan. Denies feeling depressed or sad several days of weeks. States this semester academically difficult. Sleeping well, eating well. No relationship issues with family or friends. Feels well adjusted.  Admits some irritable and feels anxious- feels not interfering with life. Stress relief- crochets and plays video games- still desire to do perform activities.  Able to self groom when needed. Has therapist- needs to schedule.      Often forgets to take ocp then worries about pregnancy. Went 2 months of ocp and felt mood was better.  Tried to have an [facility]UD [facility] at [facility] in 1 place that tenaculum on the cervix patient could not tolerate it.  They did not use anesthesia.  Patient still interested in IUD.    sexually active- has BF uses condoms sometimes  G0  menses: onset age 15 every 26-28 days for 5-6 days,  Bleeding is moderate first 1-2 days with dysmenorrhea  Birth control; none  Last pap: 12/21/2023 normal pap at school  History of abnormal pap: denies  On vit D daily - no only in MVI   MVI- yes  Calcium- consumes dairy  Colonoscopy- no  Mammogram- no  Dexa  Exercise 1 hour few times a week, then stops  Diet: fish, chicken, meat, 1-2 vegs, some fruit  Dentist regularly- yes  Annual eye exam- yes, wears glasses  Moved from [place] 2017 and entering Senior year at [employer]-needs [employer]  Born in [place] and mother speaks [place]ian in [place]ian.  Patient speaks English  Denies hospitalizations, fractures or concussions  Denies exercise intolerance or history of asthma or heart problems or  chest pain  Etoh: 0 drinks per month  Cigs: denies tobacco, denies vaping  Immunizations: UTD needs Covid booster in fall  FH significant: reviewed    Family History   Problem Relation Age of Onset    Thyroid disease Mother     Asthma Father     Diabetes Maternal Grandmother     Glaucoma Maternal Grandmother     Hypertension Maternal Grandmother     Diabetes Paternal Grandmother     Diabetes Paternal Grandfather          Patient Active Problem List   Diagnosis    Intrinsic atopic dermatitis    Family history of breast cancer    Immunity status testing    Family history of hyperlipidemia    BMI 25.0-25.9,adult    Impacted cerumen of right ear    Acute eczematoid otitis externa of right ear    New onset headache    Stress    Amenorrhea    Anxiety and depression    Tension headache     Current Outpatient Medications   Medication Sig Dispense Refill    Alclometasone Dipropionate 0.05 % External Ointment 1 Ring every 28 days. FOR 21 DAYS IN, THEN REMOVE FOR 7 DAYS      clobetasol 0.05 % External Solution APPLY TO AFFECTED AREA OF SCALP, EYEBROWS, AND RIGHT EAR TWICE A DAY FOR TWO WEEKS.      ketoconazole 2 % External Cream APPLY TO AFFECTED AREAS OF THE FACE TWICE DAILY FOR 2 WEEKS, THEN DECREASE TO ONCE WEEKLY.      tacrolimus 0.1 % External Ointment 1 Ring every 28 days. FOR 21 DAYS IN, THEN REMOVE FOR 7 DAYS      Norgestimate-Eth Estradiol 0.25-35 MG-MCG Oral Tab Take 1 tablet by mouth daily.      Multiple Vitamins-Minerals (CENTRUM) Oral Tab Take 1 tablet by mouth daily.       Wt Readings from Last 6 Encounters:   12/19/24 145 lb 9.6 oz (66 kg)   06/18/24 145 lb 6.4 oz (66 kg)   04/05/24 147 lb 12.8 oz (67 kg)   03/04/24 144 lb 9.6 oz (65.6 kg)   08/01/23 132 lb 6.4 oz (60.1 kg)   07/21/22 125 lb (56.7 kg) (45%, Z= -0.13)*     * Growth percentiles are based on CDC (Girls, 2-20 Years) data.     Body mass index is 26.89 kg/m².    No results found for: \"GLUCOSE\"  Lab Results   Component Value Date    CHOLEST 174  07/27/2022     Lab Results   Component Value Date    HDL 65 (H) 07/27/2022     No results found for: \"TRIGLY\"  Lab Results   Component Value Date    LDL 93 07/27/2022     Lab Results   Component Value Date    AST 17 12/20/2019     Lab Results   Component Value Date    ALT 13 12/20/2019     Lab Results   Component Value Date    TSH 2.050 07/27/2022     Lab Results   Component Value Date    BUN 15 12/20/2019    CREATSERUM 0.84 12/20/2019       General Health     How would you describe your current health state?: Fair    Type of Diet: Other (regular diet)    How do you maintain positive mental well-being?: Social Interaction;Visiting Family;Visiting Friends    How would you describe your daily physical activity?: Light    If you are a male age 45-79 or a female age 55-79, do you take aspirin?: No    Have you had any immunizations at another office such as Influenza, Hepatitis B, Tetanus, or Pneumococcal?: No    At any time do you feel concerned for the safety/well-being of yourself and/or your children, in your home or elsewhere?: No     CAGE:     Cut: Have you ever felt you should Cut down on your drinking?: No    Annoyed: Have people Annoyed you by criticizing your drinking?: No    Guilty: Have you ever felt bad or Guilty about your drinking?: No    Eye Opener: Have you ever had a drink first thing in the morning to steady your nerves or to get rid of a hangover (Eye opener)?: No    Scoring  Total Score: 0     Depression Screening (PHQ-2/PHQ-9): Over the LAST 2 WEEKS   Little interest or pleasure in doing things (over the last two weeks)?: Not at all    Feeling down, depressed, or hopeless (over the last two weeks)?: More than half the days    PHQ-2 SCORE: 2        PREVENTATIVE SERVICES  INDICATIONS AND SCHEDULE Recommendation Internal Lab or Procedure External Lab or Procedure   Breast Cancer Screening   Every 2 yrs age 50-74 No recommendations at this time    Pap Every 3 yrs age 21-65 or Pap and HPV every 5 yrs age  30-65 Health Maintenance   Topic Date Due    Pap Smear  12/19/2026       Chlamydia Screening Screen Annually age<25, if sex active/on OCPs; >24 high risk No results found for: \"CHLAMYDIA\"    Colonoscopy Screen Every 10 years No recommendations at this time    Flex Sigmoidoscopy Screen  Every 5 years No results found for this or any previous visit.    Fecal Occult Blood  Annually No results found for: \"FOB\", \"OCCULTSTOOL\"    Obesity Screening Screen all adults annually Body mass index is 26.89 kg/m².      Preventive Services for Which Recommendations Vary with Risk Recommendation Internal Lab or Procedure External Lab or Procedure   Cholesterol Screening Recommended screening varies with age, risk and gender LDL Cholesterol (mg/dL)   Date Value   07/27/2022 93       Diabetes Screening  if history of high blood pressure or other  risk factors No results found for: \"A1C\"  Glucose (mg/dL)   Date Value   12/20/2019 132 (H)         Gonorrhea Screening if high risk No results found for: \"GONOCOCCUS\"    HIV Screening For all adults age 18-65, older adults at increased risk No results found for: \"HIV\"    Syphilis Screening Screen if pregnant or high risk No results found for: \"RPR\"    Hepatitis C Screening Screen those at high risk plus screen one time for adults born 1943-1 965 No results found for: \"HCVAB\"    Tuberculosis Screen if high risk No components found for: \"PPDINDURAT\"      Disease Monitoring:    SPECIFIC DISEASE MONITORING Internal Lab or Procedure External Lab or Procedure   Annual Monitoring of Persistent     Medications (ACE/ARB, digoxin, diuretics)    Potassium  Annually Potassium (mmol/L)   Date Value   12/20/2019 4.0         No data to display                Creatinine  Annually Creatinine (mg/dL)   Date Value   12/20/2019 0.84         No data to display                Digoxin Serum Conc  Annually No results found for: \"DIGOXIN\"      No data to display                Diabetes      HgbA1C  Annually No  results found for: \"A1C\"      No data to display                Creat/alb ratio  Annually Creatinine (mg/dL)   Date Value   12/20/2019 0.84        LDL  Annually LDL Cholesterol (mg/dL)   Date Value   07/27/2022 93         No data to display                 Dilated Eye exam  Annually      No data to display                   No data to display                Asthma  (Annually between Nov. 1 & Dec. 31)    Date of last AAP/ACT and counseling given on importance of controller meds.                 ALLERGIES:     Allergies   Allergen Reactions    [place] ITCHING, PAIN, RASH and SWELLING       CURRENT MEDICATIONS:   Current Outpatient Medications   Medication Sig Dispense Refill    Alclometasone Dipropionate 0.05 % External Ointment 1 Ring every 28 days. FOR 21 DAYS IN, THEN REMOVE FOR 7 DAYS      clobetasol 0.05 % External Solution APPLY TO AFFECTED AREA OF SCALP, EYEBROWS, AND RIGHT EAR TWICE A DAY FOR TWO WEEKS.      ketoconazole 2 % External Cream APPLY TO AFFECTED AREAS OF THE FACE TWICE DAILY FOR 2 WEEKS, THEN DECREASE TO ONCE WEEKLY.      tacrolimus 0.1 % External Ointment 1 Ring every 28 days. FOR 21 DAYS IN, THEN REMOVE FOR 7 DAYS      Norgestimate-Eth Estradiol 0.25-35 MG-MCG Oral Tab Take 1 tablet by mouth daily.      Multiple Vitamins-Minerals (CENTRUM) Oral Tab Take 1 tablet by mouth daily.        MEDICAL INFORMATION:   Past Medical History:    Patient denies medical problems      Past Surgical History:   Procedure Laterality Date    Oral surgery procedure      widsom tooth 06/2021       Family History   Problem Relation Age of Onset    Thyroid disease Mother     Asthma Father     Diabetes Maternal Grandmother     Glaucoma Maternal Grandmother     Hypertension Maternal Grandmother     Diabetes Paternal Grandmother     Diabetes Paternal Grandfather       SOCIAL HISTORY:   Social History     Socioeconomic History    Marital status: Significant Other   Tobacco Use    Smoking status: Never     Passive exposure:  Never    Smokeless tobacco: Never   Vaping Use    Vaping status: Never Used   Substance and Sexual Activity    Alcohol use: Yes     Comment: Drinks probably less than once a month    Drug use: Never   Other Topics Concern    Caffeine Concern No    Exercise No    Seat Belt No    Special Diet No    Stress Concern No    Weight Concern No     Occ: student- [employer]   : single       REVIEW OF SYSTEMS:   GENERAL: feels well otherwise  SKIN: denies any unusual skin lesions  EYES: denies blurred vision or double vision  HEENT: denies nasal congestion, sinus pain or ST  LUNGS: denies shortness of breath with exertion  CARDIOVASCULAR: denies chest pain on exertion  GI: denies abdominal pain, denies heartburn  : denies dysuria, vaginal discharge or itching, periods regular   MUSCULOSKELETAL: denies back pain  NEURO: denies headaches  PSYCHE: hx depression and anxiety  HEMATOLOGIC: denies hx of anemia  ENDOCRINE: denies thyroid history  ALL/ASTHMA: denies hx of allergy or asthma    EXAM:   /64   Pulse 86   Temp 97.1 °F (36.2 °C) (Temporal)   Resp 18   Ht 5' 1.7\" (1.567 m)   Wt 145 lb 9.6 oz (66 kg)   LMP 12/14/2024 (Exact Date)   SpO2 99%   BMI 26.89 kg/m²    Patient's last menstrual period was 12/14/2024 (exact date).   GENERAL: well developed, well nourished, in no apparent distress  SKIN: no rashes, no suspicious lesions  HEENT: atraumatic, normocephalic, ears and throat are clear  EYES:PERRLA, EOMI, normal optic disk, conjunctiva are clear  NECK: supple, no adenopathy, no bruits  CHEST: no chest tenderness  BREAST: no dominant or breast nodules or masses on palpation, axilla clear bilateral  LUNGS: clear to auscultation  CARDIO: RRR without murmur  GI: good BS's, no masses, HSM or tenderness  :deferred  RECTAL: deferred  MUSCULOSKELETAL: back is not tender, FROM of the back  EXTREMITIES: no cyanosis, clubbing or edema  NEURO: Oriented times three, cranial nerves are intact, motor and sensory are  grossly intact  Psyche: normal affect.  Good eye contact.  Well-groomed.  no tearfulness and crying.  Normal speech.  Appropriate insight.  Very engaging.  Has appropriate answers to questions.  Has had some suicidal thoughts just wishing she was not here or dead during stressful times but has no plan or intention of committing suicide.  Does feel hopeful.  No anhedonia.  Denies giving away her close her belongings.  Happy on break    ASSESSMENT AND OTHER RELEVANT CHRONIC CONDITIONS:   Adalgisa Duque is a 24 year old female who presents for an Annual Health Assessment.     PLAN SUMMARY:   1. Annual physical exam   -Encourage Mediterranean diet  -Encouraged exercise 30 minutes to 60 minutes daily x 3-5x weekly for 150 minutes or more to prevent obesity and chronic disease and eliminate stress and its effect on the body.  -encouraged to continue not smoking  -safe sex practices - recommend condom use  -mammogram order placed- if age 40y or older, recommend annual  -self breast exams encouraged monthly  -immunizations-UTD, recommend annual influenza shot in fall  -Vitamin D3  2000 units daily recommended  -Needs 1000 mg of calcium daily for osteoporosis prevention discussed  -thin prep pap recommended every 3 years if normal   - Chlamydia/Gc Amplification; Future  - Urine Preg Test  - CBC With Differential With Platelet; Future  - Comp Metabolic Panel; Future  - Lipid Panel; Future  - TSH W Reflex To Free T4; Future    2. Mild depression  3. Suicidal ideation  4. Anxiety  - OBG Referral - In Network  Declines medication  Feels overall functioning well  Mood was better off OCPs but has boyfriend will see during winter break and does not want to change OCPs  Has therapist but needs to schedule appointment  -contracts for safety- if suicidal or homicidal, call 911 or go to nearest ER and call office   Encouraged to start exercising 1 hour daily will add boost mood  Patient is having no difficulties with  day-to-day function with grooming, eating, sleeping or managing relationships.  Completing schoolwork and assignments  Discussed if worsening symptoms options of medications to treat.  Not uncommon to have thoughts of wishing he were dead during times of stress because of the way to end the stress.  Has no active plan or intent to harm herself  Has serious boyfriend, no issues with family excited to go to [place]  Agrees if symptoms worsen or change will schedule office visit for reevaluation or call ASAP if becomes suicidal or call 911 or go to the nearest ER      5. BMI 26.0-26.9,adult  Normal BMI 25  Encouraged healthy lifestyle 150 to 300 minutes weekly of regular exercise and Mediterranean diet  Work on 5 to 10 pound weight loss over next year      6. Screening for chlamydial disease  - Chlamydia/Gc Amplification; Future    7. Encounter for surveillance of contraceptive pills  - Urine Preg Test- negative  - Norgestimate-Eth Estradiol 0.25-35 MG-MCG Oral Tab; Take 1 tablet by mouth daily.  Dispense: 84 tablet; Refill: 3    8. Screening for endocrine, nutritional, metabolic and immunity disorder  - CBC With Differential With Platelet; Future  - Comp Metabolic Panel; Future  - TSH W Reflex To Free T4; Future    9. Encounter for insertion of Latricia IUD  - OBG Referral - In Network  Patient with mild anxiety and states has \"sensitive cervix\".  Discussed that tenaculum can be very painful 1 placed without anesthesia.  Many OB/GYN's will perform a cervical block prior to placing tenaculum and dilating uterus for insertion of IUD.  Patient needs to consult OB/GYN/MD/   Since forgets pills and had mood changes would do well with IUD.  Does not want to change OCPs at this time  Will schedule appointment with OB/GYN when returns from [place] in January      The patient indicates understanding of these issues and agrees to the plan.  Return in about 1 year (around 12/19/2025) for annual physical, fasting labs AM, sooner if  needed..    Diet counseling perfomed  Exercise counseling perfomed  STI Prevention counseling perfomed    SUGGESTED VACCINATIONS - Influenza, Pneumococcal, Zoster, Tetanus     Immunization History   Administered Date(s) Administered    Covid-19 Vaccine Moderna 100 mcg/0.5 ml 04/21/2021, 05/19/2021    Covid-19 Vaccine Moderna 50 Mcg/0.25 Ml 12/21/2021    Covid-19 Vaccine Moderna Bivalent 50mcg/0.5mL 12+ years 12/09/2022    DTAP 02/13/2003, 04/14/2003, 06/11/2003, 03/26/2004, 03/26/2009, 03/09/2013    FLULAVAL 6 months & older 0.5 ml Prefilled syringe (10930) 11/15/2018, 11/25/2019, 09/17/2020    FLUZONE 6 months and older PFS 0.5 ml (37338) 01/08/2018, 11/15/2018, 11/25/2019, 09/17/2020, 11/02/2021    HEP A,Ped/Adol,(2 Dose) 03/18/2006, 05/19/2007    HEP B, Ped/Adol 02/13/2003, 04/14/2003, 01/08/2018    HPV (Gardasil) 03/31/2014, 10/15/2014    IPV 02/13/2003, 04/14/2003, 03/26/2004, 03/09/2013    Influenza 11/04/2023, 11/26/2024    MMR 03/26/2004, 02/28/2008    Meningococcal-Menactra 06/24/2005    Meningococcal-Menveo 2month-55yr 07/30/2020    Moderna Covid-19 Vaccine 50mcg/0.5ml 12yrs+ 11/05/2023, 11/26/2024    Pneumococcal (Prevnar 7) 02/13/2003, 04/14/2003, 06/11/2003, 03/05/2005    TDAP 08/03/2021    Varicella 02/03/2004    Varicella Vaccine 01/08/2018       Influenza Annually   Pneumococcal if high risk   Td/Tdap once then every 10 years   HPV Females 11-26: 3 doses   Zoster (Shingles) 60 and older: one dose   Varicella 2 doses if not immune   MMR 1-2 doses if born after 1954 and not immune

## 2024-12-19 NOTE — PATIENT INSTRUCTIONS
Perform labs fasting 8 hours with water or black coffee or or black tea diet  soda only prior to exam.    -Encourage healthy diet of whole food and avoid processed food and sugary drinks and sodas.  Diet should include lean meats and vegetables including 5-7 servings of fruit and vegetables total in 1 day.  Never skip breakfast.  -Encouraged exercise 30 minutes to 60 minutes 3-5 times weekly for 150minutes or more to prevent obesity and chronic disease and eliminate stress and its effect on the body.  -encouraged to continue not smoking or vaping  - recommend condom use per CDC recommendation for all  or unmarried couples  -mammogram order given if 40years old or older  - immunizations-annual flu shot recommended  -Vitamin D3  2000 units daily recommended- buy Over-the-counter  -Recommend 1000mg of calcium daily for osteoporosis prevention discussed. Need to ingest 1000mg of calcium daily to prevent osteoporosis later in life.  I.e. one 8 ounce glass of silk [place] milk has 450 mg of calcium and label states 45%.  Labels list calcium percentages not milligrams.  To calculate milligrams per serving remove the percentage and add a zero (0).  I.e. 9% calcium equals 90 mg  -thin prep pap recommended every 3 years-If previous pap was normal  sooner as directed by your doctor.        Exercise for a Healthier Heart     Exercise with a friend. When activity is fun, you're more likely to stick with it.   You may wonder how you can improve the health of your heart. If you’re thinking about exercise, you’re on the right track. You don’t need to become an athlete, but you do need a certain amount of brisk exercise to help strengthen your heart. If you have been diagnosed with a heart condition, your doctor may recommend exercise to help stabilize your condition. To help make exercise a habit, choose safe, fun activities.  Be sure to check with your healthcare provider before starting an exercise program.   Why  exercise?  Exercising regularly offers many healthy rewards. It can help you do all of the following:  Improve your blood cholesterol level to help prevent further heart trouble  Lower your blood pressure to help prevent a stroke or heart attack  Control diabetes, or reduce your risk of getting this disease  Improve your heart and lung function  Reach and maintain a healthy weight  Make your muscles stronger and more limber so you can stay active  Prevent falls and fractures by slowing the loss of bone mass (osteoporosis)  Manage stress better  Reduce your blood pressure  Improve your sense of self and your body image  Exercise tips  Ease into your routine. Set small goals. Then build on them.  Exercise on most days. Aim for a total of 150 or more minutes of moderate to  vigorous intensity activity each week. Consider 40 minutes, 3 to 4 times a week. For best results, activity should last for 40 minutes on average. It is OK to work up to the 40 minute period over time. Examples of moderate-intensity activity is walking 1 mile in 15 minutes or 30 to 45 minutes of yard work.  Step up your daily activity level. Along with your exercise program, try being more active throughout the day. Walk instead of drive. Do more household tasks or yard work.  Choose one or more activities you enjoy. Walking is one of the easiest things you can do. You can also try swimming, riding a bike, dancing, or taking an exercise class.  Stop exercising and call your doctor if you:  Have chest pain or feel dizzy or lightheaded  Feel burning, tightness, pressure, or heaviness in your chest, neck, shoulders, back, or arms  Have unusual shortness of breath  Have increased joint or muscle pain  Have palpitations or an irregular heartbeat   Date Last Reviewed: 5/1/2016 © 2000-2018 [employer]. [address] [place] [address]. All rights reserved. This information is not intended as a substitute for professional medical  care. Always follow your healthcare professional's instructions.      Eating Heart-Healthy Foods  Eating has a big impact on your heart health. In fact, eating healthier can improve several of your heart risks at once. For instance, it helps you manage weight, cholesterol, and blood pressure. Here are ideas to help you make heart-healthy changes without giving up all the foods and flavors you love.  Getting started  Talk with your healthcare provider about eating plans, such as the DASH or Mediterranean diet. You may also be referred to a dietitian.  Change a few things at a time. Give yourself time to get used to a few eating changes before adding more.  Work to create a tasty, healthy eating plan that you can stick to for the rest of your life.    Goals for healthy eating  Below are some tips to improve your eating habits:  Limit saturated fats and trans fats. Saturated fats raise your levels of cholesterol, so keep these fats to a minimum. They are found in foods such as fatty meats, whole milk, cheese, and palm and coconut oils. Avoid trans fats because they lower good cholesterol as well as raise bad cholesterol. Trans fats are most often found in processed foods.  Reduce sodium (salt) intake. Eating too much salt may increase your blood pressure. Limit your sodium intake to 2,300 milligrams (mg) per day (the amount in 1 teaspoon of salt), or less if your healthcare provider recommends it. Dining out less often and eating fewer processed foods are two great ways to decrease the amount of salt you consume.  Managing calories. A calorie is a unit of energy. Your body burns calories for fuel, but if you eat more calories than your body burns, the extras are stored as fat. Your healthcare provider can help you create a diet plan to manage your calories. This will likely include eating healthier foods as well as exercising regularly. To help you track your progress, keep a diary to record what you eat and how often  you exercise.  Choose the right foods  Aim to make these foods staples of your diet. If you have diabetes, you may have different recommendations than what is listed here:  Fruits and vegetables provide plenty of nutrients without a lot of calories. At meals, fill half your plate with these foods. Split the other half of your plate between whole grains and lean protein.  Whole grains are high in fiber and rich in vitamins and nutrients. Good choices include whole-wheat bread, pasta, and brown rice.  Lean proteins give you nutrition with less fat. Good choices include fish, skinless chicken, and beans.  Low-fat or nonfat dairy provides nutrients without a lot of fat. Try low-fat or nonfat milk, cheese, or yogurt.  Healthy fats can be good for you in small amounts. These are unsaturated fats, such as olive oil, nuts, and fish. Try to have at least 2 servings per week of fatty fish, such as salmon, sardines, mackerel, rainbow trout, and albacore tuna. These contain omega-3 fatty acids, which are good for your heart. Flaxseed is another source of a heart-healthy fat.  More on heart-healthy eating  Read food labels  Healthy eating starts at the grocery store. Be sure to pay attention to food labels on packaged foods. Look for products that are high in fiber and protein, and low in saturated fat, cholesterol, and sodium. Avoid products that contain trans fat. And pay close attention to serving size. For instance, if you plan to eat two servings, double all the numbers on the label.  Prepare food right  A key part of healthy cooking is cutting down on added fat and salt. Look on the internet for lower-fat, lower-sodium recipes. Also, try these tips:  Remove fat from meat and skin from poultry before cooking.  Skim fat from the surface of soups and sauces.  Broil, boil, bake, steam, grill, and microwave food without added fats.  Choose ingredients that spice up your food without adding calories, fat, or sodium. Try these  items: horseradish, hot sauce, lemon, mustard, nonfat salad dressings, and vinegar. For salt-free herbs and spices, try basil, cilantro, cinnamon, pepper, and rosemary.  Date Last Reviewed: 10/1/2017  © 4424-7586 [employer]. [address] [place] [address]. All rights reserved. This information is not intended as a substitute for professional medical care. Always follow your healthcare professional's instructions.       What Is Osteoporosis?  Osteoporosis is a disease that weakens the bones. Weakened bones are more likely to break (fracture). Osteoporosis affects both men and women. But postmenopausal women are most at risk. To help prevent osteoporosis, you need to exercise and nourish your bones throughout your life.    Childhood  The body builds the most bone during these years. That's why boys and girls need foods rich in calcium. They also need plenty of exercise. A healthy diet and exercise helps bones grow strong.  Young adulthood to age 30  During young adulthood, bones become their strongest. This is called peak bone mass. The same good habits that kept bones healthy in childhood help keep bones healthy in adulthood.  Age 30 to menopause  Bone mass declines slightly during these years. Your body makes just enough new bone to maintain peak bone mass. To keep your bones at their peak mass, be sure to exercise and get plenty of calcium.  After menopause  Menopause is when a woman stops having monthly periods. After menopause, the body makes less estrogen (female hormone). This increases bone loss. At this point, treatment may be needed to reduce the risk for fracture. Exercise and calcium can also help keep your bones strong.  Later in life  In later years, both men and women need to take extra care of their bones. By this point, the body loses more bone than it makes. If too much bone is lost, you may be at increased risk for fractures. With age, the quality and quantity of bone  declines. You can lessen bone loss by staying active and increasing your calcium intake. Calcium supplements and other osteoporosis treatments do have risks. So talk with your healthcare provider if you have concerns. If you have osteoporosis, you can also learn ways to increase everyday safety.  Date Last Reviewed: 5/1/2018  © 5675-3753 [employer]. [address] [place] [address], [place] [address]. All rights reserved. This information is not intended as a substitute for professional medical care. Always follow your healthcare professional's instructions.          Preventing Osteoporosis: Meeting Your Calcium Needs    Your body needs calcium to build and repair bones. But it can't make calcium on its own. That's why it's important to eat calcium-rich foods. Some foods are naturally rich in calcium. Others have calcium added (fortified). It's best to get calcium from the foods you eat. But if you can't get enough, you may want to take calcium supplements. To meet your daily calcium needs, try the foods listed below.               Dairy Fish & beans Other sources   Source   Calcium (mg) per serving   Source   Calcium (mg) per serving   Source   Calcium (mg) per serving   Low-fat yogurt, plain   415 mg/8 oz.   Sardines, [place], canned, with bones   351 mg/3 oz.   Oatmeal, instant, fortified   215 mg/1 cup   Nonfat milk   302 mg/1 cup   [place], sockeye, canned, with bones   239 mg/3 oz.   Tofu made with calcium sulfate   204 mg/3 oz.   Low-fat milk   297 mg/1 cup   Soybeans, fresh, boiled   131 mg/1/2 cup   Collards   179 mg/1/2 cup   Swiss cheese   272 mg/1 oz.   White beans, cooked   81 mg/1/2 cup   English muffin, whole wheat   175 mg/1 muffin   Cheddar cheese   205 mg/1 oz.   Navy beans, cooked   79 mg/1/2 cup   Kale   90 mg/1/2 cup   Ice cream strawberry   79 mg/1/2 cup           Orange, navel   56 mg/1 medium   Note: Calcium levels may vary depending on brand and size.  Daily calcium needs  14 to 18  years old: 1,300 mg  19 to 30 years old: 1,000 mg  31 to 50 years old: 1,000 mg  51 to 70 years old, women: 1,200 mg  51 to 70 years old, men: 1,000 mg  Pregnant or nursin to 18 years old: 1,300 mg, 19 to 50 years old: 1,000 mg  Older than 70 (women and men): 1,200 mg   Date Last Reviewed: 2018-2018 [employer]. [address] [place] [address]. All rights reserved. This information is not intended as a substitute for professional medical care. Always follow your healthcare professional's instructions.          Vitamin D  Does this test have other names?  25-hydroxyvitamin D (25-high-DROX-ee-VIE-tuh-min D), 25(OH)D  What is this test?  Vitamin D is mainly found in fortified dairy foods, juice, breakfast cereal, and certain fish. This vitamin plays many roles in the body. But because it helps the body absorb calcium from foods and supplements, it's particularly important for bone health. Vitamin D has many additional roles in the body.  Vitamin D comes in several forms. When ultraviolet light, such as sunlight, hits your skin, it creates vitamin D3. D2 is used to fortify dairy foods. Both of these are further processed by your liver and kidneys into a form your body can use. Most tests for vitamin D check the level of a form circulating in the body called 25-hydroxyvitamin D, also called 25(OH)D.   Why do I need this test?  You may need this test if your healthcare provider wants to check your vitamin D levels to find out if you have any risks to bone health. These might be:  Low calcium  Soft bones caused by low vitamin D or problems using it (osteomalacia)  Osteopenia  Osteoporosis  Rickets, in children  You may also need this test if you are at risk for low vitamin D levels. Risks include:  Being an older adult  Having difficulty absorbing fat from your diet  Having chronic kidney disease  Have dark skin pigmentation  Being a  baby  Vitamin D has many effects in the  body. You may need this test to help your healthcare provider diagnose or treat:  Problems with the parathyroid gland  Cancer  Autoimmune diseases, such as multiple sclerosis and Crohn's disease  Psoriasis  Asthma  Weakness or falls    What other tests might I have along with this test?  A healthcare provider may also want to check your parathyroid hormone levels and your calcium levels.   What do my test results mean?  Test results may vary depending on your age, gender, health history, the method used for the test, and other things. Your test results may not mean you have a problem. Ask your healthcare provider what your test results mean for you.   Children and adults need more than 30 nanograms per milliliter (ng/ml) of vitamin D. The optimal level of 25(OH)D is usually between 30 and 60 ng/mL. Recommended daily amounts range from 400 to 800 international units (IU) per day based on your age.  Levels lower than normal can mean you are:  Not making enough vitamin D on your own  Not getting enough vitamin D in your diet  Not absorbing vitamin D from your food as you should  Lower levels may also mean that your body is not converting the vitamin as it should. This might be because of kidney or liver disease.  Above-normal levels may be a sign that you're taking too much in supplement form.   How is this test done?  The test is done with a blood sample. A needle is used to draw blood from a vein in your arm or hand.   Does this test pose any risks?  Having a blood test with a needle carries some risks. These include bleeding, infection, bruising, and feeling lightheaded. When the needle pricks your arm or hand, you may feel a slight sting or pain. Afterward, the site may be sore.   What might affect my test results?  The amount of time you spend in the sunlight, your diet, and whether you take vitamin D in supplement form can affect your vitamin D levels. Ask your healthcare provider if any health conditions you  have or medicines you take could affect your results.  How do I get ready for this test?  Tell your healthcare provider if you take vitamin D supplements. Be sure your healthcare provider knows about all medicines, herbs, vitamins, and supplements you are taking. This includes medicines that don't need a prescription and any illicit drugs you may use.   © 2394-7331 [employer] [address] [place] [address]. All rights reserved. This information is not intended as a substitute for professional medical care. Always follow your healthcare professional's instructions.          Preventing Osteoporosis: Avoiding Bone Loss  Certain factors can speed up bone loss or decrease bone growth. For example, alcohol, cigarettes, and certain medicines reduce bone mass. Some foods make it hard for your body to absorb calcium.    Things to avoid  Here are things to avoid to help prevent osteoporosis:  Alcohol. This is toxic to bones. It is a major cause of bone loss. Heavy drinking can cause osteoporosis even if you have no other risk factors.  Smoking. This reduces bone mass. Smoking may also interfere with estrogen levels and cause early menopause.  Inactivity. Not being active makes your bones lose strength and become thinner. Over time, thin bones may break. Women who aren't active are at a high risk for osteoporosis.  Certain medicines. Some medicines, such as cortisone, increase bone loss. They also decrease bone growth. Ask your healthcare provider about any side effects of your medicines, and how to prevent them.  Protein-rich or salty foods. Eaten in large amounts, these foods may deplete calcium.  Caffeine. This increases calcium loss. People who drink a lot of coffee, tea, or soda lose more calcium than those who don't.  Date Last Reviewed: 5/1/2018  © 8410-7239 [employer] [address] [place] [address]. All rights reserved. This information is not intended as a  substitute for professional medical care. Always follow your healthcare professional's instructions.          Living with Osteoporosis: Regular Exercise  If you have osteoporosis, exercise is vital for your health. It can prevent bone fractures and spine changes. It will slow bone loss. Exercise will strengthen your body. It can also be fun. A variety of exercises is best. See below for exercises that can help you. But before you start, talk with your healthcare provider to be sure these exercises are right for you.    Resistance exercises. These build muscle strength and maintain bone mass. They also make you less prone to injury. Exercises include lifting small weights, doing push-ups and sit-ups, using elastic exercise bands, and using weight machines.  Weight-bearing activities. These help your whole body. They also help you maintain bone mass. Activities include walking, dancing, and housework.  Non-weight-bearing exercises. These help prevent back strain and pain. They do this by building the trunk and leg muscles. Exercises that help with flexibility can prevent falls. Examples include swimming, water exercise, and stretching.  Staying safe  Here are tips to stay safe:   Always check with your healthcare provider before starting any new exercise program.  Use weights only as instructed.  Stop any exercise that causes pain.   Date Last Reviewed: 5/1/2018  © 3787-8205 [employer]. [address] [place] [address], [place] [address]. All rights reserved. This information is not intended as a substitute for professional medical care. Always follow your healthcare professional's instructions.

## 2024-12-20 LAB
C TRACH DNA SPEC QL NAA+PROBE: NEGATIVE
N GONORRHOEA DNA SPEC QL NAA+PROBE: NEGATIVE

## 2024-12-20 NOTE — TELEPHONE ENCOUNTER
DR. Hogan:   Pt is scheduled fore   RIGHT TOTAL KNEE ARTHROPLASTY     With MD Flores on 1/6/25     They are requesting an ASA hold (up to cardiology to determine hold)?    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  - ok to proceed to surgery  - pt is optimized from cardiac standpoint     *Please advise on ASA hold*   Patient calling- requires 1 dose TDAP before starting college. Did receive 5 doses DTAP  Order pended. Will call back to schedule nurse visit when approved.

## 2025-02-06 NOTE — PROGRESS NOTES
[facility]  Obstetrics and Gynecology  History & Physical    CC: Patient is a new patient and here for a well woman exam     Subjective:     HPI: Adalgisa Duque is a 24 year old  female here for a well women exam. Patient reports overall doing well, would like to discuss re-starting OCPs. Was originally on cOCPs but self-discontinued due to undesired side effect of anxiety. Attempted Paragard IUD placement but stopped mid-procedure due to pain with insertion. Otherwise doing well and no complaints.     OB History:  OB History    Para Term  AB Living   0 0 0 0 0 0   SAB IAB Ectopic Multiple Live Births   0 0 0 0 0       Gyne History:  Menarche: 15  Period Cycle (Days): 28  Period Duration (Days): 5  Period Flow: heavy tapering down  Use of Birth Control (if yes, specify type): OCP  Date When Birth Control Last Used: OCP and condoms  Hx Prior Abnormal Pap: No  Pap Result Notes: wnl per pt - last pap was   Patient's last menstrual period was 2024 (exact date).      + history of STDs (non-HPV). Hx of oral herpes s/p treatment a few months ago   Sexual history: Active? Yes, male partner  Birth control? Condoms, previously used cOCPs    Meds:  Medications Ordered Prior to Encounter[1]    All:  Allergies[2]    PMH:  Past Medical History:    Oral herpes    Patient denies medical problems       Immunization History:   Immunization History   Administered Date(s) Administered    Covid-19 Vaccine Moderna 100 mcg/0.5 ml 2021, 2021    Covid-19 Vaccine Moderna 50 Mcg/0.25 Ml 2021    Covid-19 Vaccine Moderna Bivalent 50mcg/0.5mL 12+ years 2022    DTAP 2003, 2003, 2003, 2004, 2009, 2013    FLULAVAL 6 months & older 0.5 ml Prefilled syringe (18902) 11/15/2018, 2019, 2020    FLUZONE 6 months and older PFS 0.5 ml (30333) 2018, 11/15/2018, 2019, 2020, 2021    HEP A,Ped/Adora,(2 Dose)  03/18/2006, 05/19/2007    HEP B, Ped/Adol 02/13/2003, 04/14/2003, 01/08/2018    HPV (Gardasil) 03/31/2014, 10/15/2014    IPV 02/13/2003, 04/14/2003, 03/26/2004, 03/09/2013    Influenza 11/04/2023, 11/26/2024    MMR 03/26/2004, 02/28/2008    Meningococcal-Menactra 06/24/2005    Meningococcal-Menveo 2month-55yr 07/30/2020    Moderna Covid-19 Vaccine 50mcg/0.5ml 12yrs+ 11/05/2023, 11/26/2024    Pneumococcal (Prevnar 7) 02/13/2003, 04/14/2003, 06/11/2003, 03/05/2005    TDAP 08/03/2021    Varicella 02/03/2004    Varicella Vaccine 01/08/2018       PSH:  Past Surgical History:   Procedure Laterality Date    Oral surgery procedure      widsom tooth 06/2021        Social History:  Social History     Socioeconomic History    Marital status: Significant Other     Spouse name: Not on file    Number of children: Not on file    Years of education: Not on file    Highest education level: Not on file   Occupational History    Not on file   Tobacco Use    Smoking status: Never     Passive exposure: Never    Smokeless tobacco: Never   Vaping Use    Vaping status: Never Used   Substance and Sexual Activity    Alcohol use: Yes     Comment: Drinks probably less than once a month    Drug use: Yes     Types: Cannabis     Comment: occ    Sexual activity: Yes     Partners: Male     Birth control/protection: OCP, Condom   Other Topics Concern    Caffeine Concern No    Exercise No    Seat Belt No    Special Diet No    Stress Concern No    Weight Concern No   Social History Narrative    Not on file     Social Drivers of Health     Food Insecurity: Not on file   Transportation Needs: Not on file   Stress: Not on file   Housing Stability: Not on file         Patient feels unsafe or threatened?: no    Abuse: no physical, sexual or mental.     Family History:  Family History   Problem Relation Age of Onset    Thyroid disease Mother     Asthma Father     Diabetes Maternal Grandmother     Glaucoma Maternal Grandmother     Hypertension Maternal  Grandmother     Diabetes Paternal Grandmother     Diabetes Paternal Grandfather        Health maintenance:  Mammogram (age 40 and q1-2yr): n/a  Colonoscopy (age 45 and q10yr): n/a    Review of Systems:  General:  denies fevers, chills, fatigue and malaise  Breast:  denies rashes, skin changes, pain, lumps or discharge   Respiratory:  denies SOB, dyspnea, cough or wheezing  Cardiovascular:  denies chest pain, palpitations  GI: denies abdominal pain, diarrhea, constipation  :  denies dysuria, hematuria, increased urinary frequency.   Heme:  denies history of excessive bleeding or bruising      Objective:     Vitals:    25 1506   BP: 118/78   Pulse: 96   Weight: 148 lb (67.1 kg)   Height: 61\"         Body mass index is 27.96 kg/m².    General: AAO.NAD.   CVS exam: normal peripheral perfusion  Chest: non-labored breathing, no tachypnea   Breast: symmetric, no dominant or suspicious mass, no skin or nipple changes and no axillary adenopathy  Abdominal exam: soft, nontender, nondistended  Pelvic exam:   VULVA: normal appearing vulva with no masses, tenderness or lesions  PERINEUM: normal appearing, no lesions   URETHRAL MEATUS: normal appearing, no lesions   VAGINA: normal appearing vagina with normal color and discharge, no lesions  CERVIX: normal appearing cervix without discharge or lesions  UTERUS: uterus is normal size, shape, consistency and nontender  ADNEXA: normal adnexa in size, nontender and no masses  PERIRECTAL: normal appearing, no lesions   Ext: non-tender, no edema    Assessment:     Adalgisa Duque is a 24 year old  female here for a well women exam.     Patient Active Problem List   Diagnosis    Intrinsic atopic dermatitis    Family history of breast cancer    Immunity status testing    Mild depression    Family history of hyperlipidemia    BMI 26.0-26.9,adult    Impacted cerumen of right ear    Acute eczematoid otitis externa of right ear    New onset headache    Stress     Anxiety    Tension headache         Plan:     Problem List Items Addressed This Visit    None  Visit Diagnoses       Well woman exam with routine gynecological exam    -  Primary    Relevant Medications    Norethindrone (ORTHO MICRONOR) 0.35 MG Oral Tab    Encounter for initial prescription of contraceptive pills        Relevant Medications    Norethindrone (ORTHO MICRONOR) 0.35 MG Oral Tab              Cervical cancer screening  - discussion held with the patient about ASCCP guidelines  - repeat pap smear due 12/2026; NILM on 12/2023  Health maintenance  - encouraged to maintain weight at healthy BMI  - discussed importance of exercise and healthy eating  - self breast exam instructions provided  - Rx for ortho micronor OCP sent to pt's pharmacy    All of the findings and plan were discussed with the patient.  She notes understanding and agrees with the plan of care.  All questions were answered to the best of my ability at this time.      RTC in 3 months for f/u of medication management     Juliet Ho DO  [facility] - OBGYN      Discussed with patient that there will not be further notification of normal or benign results other than receiving results on [employer]. A [employer] message or telephone call will be placed by the physician and/or office staff if results are abnormal.     Note to patient and family   The 21st Century Cures Act makes medical notes available to patients in the interest of transparency.  However, please be advised that this is a medical document.  It is intended as qdwk-df-beuq communication.  It is written and medical language may contain abbreviations or verbiage that are technical and unfamiliar.  It may appear blunt or direct.  Medical documents are intended to carry relevant information, facts as evident, and the clinical opinion of the practitioner.        This note could include assistance by Dragon voice recognition. Errors in content may be related to improper recognition by the  system; efforts to review and correct have been done but errors may still exist.        [1]   Current Outpatient Medications on File Prior to Visit   Medication Sig Dispense Refill    Norgestimate-Eth Estradiol 0.25-35 MG-MCG Oral Tab Take 1 tablet by mouth daily. 84 tablet 3    Multiple Vitamins-Minerals (CENTRUM) Oral Tab Take 1 tablet by mouth daily.      Alclometasone Dipropionate 0.05 % External Ointment 1 Ring every 28 days. FOR 21 DAYS IN, THEN REMOVE FOR 7 DAYS      clobetasol 0.05 % External Solution APPLY TO AFFECTED AREA OF SCALP, EYEBROWS, AND RIGHT EAR TWICE A DAY FOR TWO WEEKS.      ketoconazole 2 % External Cream APPLY TO AFFECTED AREAS OF THE FACE TWICE DAILY FOR 2 WEEKS, THEN DECREASE TO ONCE WEEKLY.      tacrolimus 0.1 % External Ointment 1 Ring every 28 days. FOR 21 DAYS IN, THEN REMOVE FOR 7 DAYS       No current facility-administered medications on file prior to visit.   [2]   Allergies  Allergen Reactions    [place] ITCHING, PAIN, RASH and SWELLING

## 2025-02-11 NOTE — TELEPHONE ENCOUNTER
Fax received from [facility].  Possible Therapeutic Duplication:  -Jencycla/Ortho Micronor: ordered by Dr. Ho on 2/6/2025  -Abi/Estradiol: ordered on 12/19/2024 by another physician    Per office visit on 2/6/2025, patient discontinued estradiol due to side effect of anxiety.  New rx given.  Fax sent to [phone]

## 2025-04-28 NOTE — TELEPHONE ENCOUNTER
Last annual exam: 2/6/2025  Follow-up recommended: RTC in 3 months for f/u of medication management   Last refill date:   Norethindrone (ORTHO MICRONOR) 0.35 MG Oral Tab 28 tablet 3 2/6/2025 5/29/2025   Sig:   Take 1 tablet (0.35 mg total) by mouth daily.     Next appt.: 5/28/2025    Refill to be addressed at upcoming office visit.

## 2025-05-28 NOTE — PATIENT INSTRUCTIONS
Birth Control: IUD (Intrauterine Device)  The IUD (intrauterine device) is small, flexible, and T-shaped. A trained healthcare provider places it in the uterus. The IUD is one of the most effective birth control methods. It's also reversible. This means it can be removed at any time by a trained healthcare provider. New IUDs are safe and don't have the risks of older types of IUDs.     Pregnancy rates  Talk to your healthcare provider about the effectiveness of this birth control method.  Types of IUDs  IUD insertion is done in the healthcare provider’s office. Two types of IUDs are available:  The copper IUD releases a small amount of copper into the uterus. The copper makes it harder for sperm to reach the egg. The device works for at least 10 years.  The progestin IUD releases a hormone called progestin. It causes changes in the uterus to help prevent pregnancy. The device works for 3 to 8 years, depending on which device is chosen. It may be recommended for women who have anemia or heavy and painful periods.  IUDs have thin strings that hang from the opening of the uterus into the vagina. This lets you check that the IUD stays in place.   Things to know about IUDs  IUDs can be used by women who have never been pregnant or by women with a history of sexually transmitted infections (STIs) or tubal pregnancy.  It won't move from the uterus to any other part of the body.  There is a slight risk of the device coming out of the vagina (expulsion).  It may not work in women who have an abnormally shaped uterus.  A copper IUD may cause heavier periods and cramping.  A progestin IUD may cause light periods or no periods at all (irregular bleeding or spotting is possible and normal during the first 3 to 6 months).  If you get a sexually transmitted infection with an IUD in place, symptoms may be more severe.    What to report to your healthcare provider  Be sure your healthcare provider knows if you have:  A sexually  transmitted infection (STI) or possible STI  Liver problems  Blood clots (for progestin IUD only)  Breast cancer or a history of breast cancer (progestin IUD only)  Leila last reviewed this educational content on 12/1/2022  This information is for informational purposes only. This is not intended to be a substitute for professional medical advice, diagnosis, or treatment. Always seek the advice and follow the directions from your physician or other qualified health care provider.  © 1513-7248 [employer]. All rights reserved. This information is not intended as a substitute for professional medical care. Always follow your healthcare professional's instructions.

## 2025-05-28 NOTE — PROGRESS NOTES
[facility]  Obstetrics and Gynecology  Follow Up Progress Note    Subjective:     Adalgisa Duque is a 24 year old  female who was last seen in office 2025 for annual exam and presents today to discuss OCP use. The patient reports doing well on Ortho Micronor, reports periods are lighter and not painful. She is taking them daily, but states sometimes forgets to take them right on time. Denies an concerning side effects like HA, weight fluctuations, bloating, irregular bleeding, or mood swings. Pt however would like to discuss Paragard IUD insertion in OR. Attempted IUD insertion in clinic, but did not complete due to pain intolerance. Pt prefers OR procedure for re-attempt of Paragard IUD.       Review of Systems:  General: no complaints per category. See HPI for additional information.   Breast: no complaints per category. See HPI for additional information.   Respiratory: no complaints per category. See HPI for additional information.   Cardiovascular: no complaints per category. See HPI for additional information.   GI: no complaints per category. See HPI for additional information.   : no complaints per category. See HPI for additional information.   Heme: no complaints per category. See HPI for additional information.     OB History    Para Term  AB Living   0 0 0 0 0 0   SAB IAB Ectopic Multiple Live Births   0 0 0 0 0         Gyne History:     Patient's last menstrual period was 2025 (exact date).      Meds:  Medications Ordered Prior to Encounter[1]    All:  Allergies[2]    PMH:  Past Medical History[3]    PSH:  Past Surgical History[4]      Objective:     Vitals:    25 0734   BP: 104/66   Pulse: 92   Weight: 143 lb 2 oz (64.9 kg)   Height: 61\"         Body mass index is 27.04 kg/m².    General: AAO.NAD.   CVS exam: normal peripheral perfusion  Chest: non-labored breathing, no tachypnea   Breast: deferred  Abdominal exam: soft, nontender,  nondistended  Pelvic exam: deferred  Ext: non-tender, no edema    Labs:    Imaging:         Assessment:     Adalgisa Duque is a 24 year old  female who presents for contraception counseling.        Plan:     Problem List Items Addressed This Visit    None  Visit Diagnoses         Encounter for surveillance of contraceptive pills    -  Primary    Relevant Medications    Norethindrone (ORTHO MICRONOR) 0.35 MG Oral Tab      Encounter for initial prescription of contraceptive pills        Relevant Medications    Norethindrone (ORTHO MICRONOR) 0.35 MG Oral Tab              Contraception counseling  - d/w patient options for contraception including OCP, Nuvaring, Depo Provera and LARC (Nexplanon versus IUD)   - risks, benefits and alternatives discussed   - pt expressed interest in Ortho Micronor; pt has now trialed OCPs for 3 months with overall good effect, but would like to transition to LARC with Paragard IUD  - information provided including handouts  - Surgery request sent for Paragard IUD insertion under US-guidance  - Rx for refill of Ortho Micronor sent to patient's preferred pharmacy, pt to continue medication until procedure scheduled  - Rx for cytotec per vagina sent to patient's pharmacy for pre-procedure treatment    All of the findings and plan were discussed with the patient.  She notes understanding and agrees with the plan of care.  All questions were answered to the best of my ability at this time.      Total patient time was 15 minutes in evaluation, consultation, and coordination of care. This included face to face and non-face to face actions. The patient's questions and concerns were addressed.       RTC in 4 weeks for string check after Paragard insertion procedure scheduled or sooner if needed     Juliet Ho DO   [facility] - OBGYN       Discussed with patient that there will not be further notification of normal or benign results other than receiving results on [employer]. A  [employer] message or telephone call will be placed by the physician and/or office staff if results are abnormal.     Note to patient and family   The 21st Century Cures Act makes medical notes available to patients in the interest of transparency.  However, please be advised that this is a medical document.  It is intended as jgnr-zs-oygu communication.  It is written and medical language may contain abbreviations or verbiage that are technical and unfamiliar.  It may appear blunt or direct.  Medical documents are intended to carry relevant information, facts as evident, and the clinical opinion of the practitioner.        This note could include assistance by Dragon voice recognition. Errors in content may be related to improper recognition by the system; efforts to review and correct have been done but errors may still exist.          [1]   Current Outpatient Medications on File Prior to Visit   Medication Sig Dispense Refill    Alclometasone Dipropionate 0.05 % External Ointment 1 Ring every 28 days. FOR 21 DAYS IN, THEN REMOVE FOR 7 DAYS      clobetasol 0.05 % External Solution APPLY TO AFFECTED AREA OF SCALP, EYEBROWS, AND RIGHT EAR TWICE A DAY FOR TWO WEEKS.      ketoconazole 2 % External Cream APPLY TO AFFECTED AREAS OF THE FACE TWICE DAILY FOR 2 WEEKS, THEN DECREASE TO ONCE WEEKLY.      tacrolimus 0.1 % External Ointment 1 Ring every 28 days. FOR 21 DAYS IN, THEN REMOVE FOR 7 DAYS      Multiple Vitamins-Minerals (CENTRUM) Oral Tab Take 1 tablet by mouth daily.      [DISCONTINUED] Norethindrone (ORTHO MICRONOR) 0.35 MG Oral Tab Take 1 tablet (0.35 mg total) by mouth daily. 28 tablet 3    Norgestimate-Eth Estradiol 0.25-35 MG-MCG Oral Tab Take 1 tablet by mouth daily. (Patient not taking: Reported on 5/28/2025) 84 tablet 3     No current facility-administered medications on file prior to visit.   [2]   Allergies  Allergen Reactions    [place] ITCHING, PAIN, RASH and SWELLING   [3]   Past Medical History:    Oral herpes    Patient denies medical problems   [4]   Past Surgical History:  Procedure Laterality Date    Oral surgery procedure      widsom tooth 06/2021

## 2025-06-03 NOTE — TELEPHONE ENCOUNTER
Surgery scheduled 7/10/25 at 10am  Post op   Future Appointments   Date Time Provider Department Center   7/25/2025  1:45 PM Juliet Ho, DO [facility] [address]    Orders entered  Added to calendar  PA - will work on

## 2025-06-03 NOTE — TELEPHONE ENCOUNTER
----- Message from Juliet Ho sent at 5/28/2025  7:59 AM CDT -----  Regarding: Paragard IUD Placement in OR under US guidance  Alfredo Miguel,Surgeon: Dr. Juliet ParmaronAssistant: none Type of Admit: Hospital outpatient, does not require admission following surgery Procedure Location: Main ORPreOp Dx: desired contraceptionProcedure: Paragard IUD insertion under US-guidanceAnesthesia: MACSpecial Equipment/Comments: heken and martines dilators, ultrasound with ultrasound technician, paracervical block with spinal needleAntibiotics: none indicated per protocolPre Op Orders: initiate my Pre-op standing orders, if none exist please use [facility]'s Standing Order Labs: noneMedical clearance: noneThanks!Dr. Ho

## 2025-06-05 NOTE — TELEPHONE ENCOUNTER
Procedure code  82527  Description  Insertion of intrauterine device (IUD)  Inquiry summary  Notification/Prior Authorization not required for this service.  Show place of service breakdown  Procedure code  93869  Description  Ultrasonic guidance, intraoperative  Inquiry summary  Notification/Prior Authorization not required for this service.

## 2025-07-02 NOTE — TELEPHONE ENCOUNTER
Pt having a Parguard IUD placement with US done using MAC on 7/10/25.   Pre op appt 7/7/25  Labs requested- CBC- no diff  Order pended

## 2025-07-02 NOTE — TELEPHONE ENCOUNTER
Patient is having surgery on 7/10/25 with Juliet Ho.  Patient pre op appointment is 7/7/25 putting orders in nurses bin

## 2025-07-07 NOTE — PROGRESS NOTES
CC: Preop exam.    HPI:   Adalgisa Duque is a 24 year old female who presents for a pre-operative physical exam requested  By Dr.Rebecca Ho. Patient is to have hysteroscopy with dilation curettage and insertion of a ParaGard IUD under ultrasound guidance,secondary to side effects with alternative forms of birth control to be on 7/10/2025.    No prior  surgeries or general anaesthesia- had wisdom teeth extracted under local anesthesia.    Pt complains of desire for birth control due to intolerance of other forms of contraception.    History of Present Illness  The patient is a 24 year old who presents for preoperative evaluation for a hysteroscopy, D&C, and ParaGard insertion.    She is scheduled for a hysteroscopy, dilation and curettage (D&C), and ParaGard insertion on July 10th due to side effects from her current birth control pills, including increased anxiety. She has difficulty with daily pill adherence, which exacerbates her anxiety. She prefers non-hormonal contraception to avoid these side effects.    She has no history of surgeries involving general anesthesia but has undergone wisdom teeth extractions under local anesthesia. She has a history of jaw clicking, which led to the removal of a wisdom tooth. No history of snoring, but she mentions sleep talking. She feels rested in the morning and does not experience excessive fatigue.    She exercises regularly, aiming for five days a week, but has not exercised in the past two weeks due to travel. Her routine includes running and walking for 30 minutes, four times a week. No history of heart problems, asthma, chest pain, dyspnea on exertion, exercise intolerance, or dizziness with exercise. She can walk up a flight of stairs without difficulty. She occasionally experiences headaches, which she attributes to anxiety.  She denies any history of snoring or anyone complaining of her snoring.  She feels well rested in the morning.  She has  never been advised to have a sleep study for sleep apnea.  She denies any cardiopulmonary history or heart problems or history of heart murmurs.    She is currently taking a multivitamin but has stopped it in preparation for surgery.  She has a history of a traumatic experience with blood tests in her youth, leading to a preference for completing all necessary tests at once. She recently had labs drawn, which are pending results from her prior annual physical.    She is starting professional school, studying law, and is interested in immigration law.        Current Medications[1]   Allergies: Allergies[2]   Past Medical History[3]   Past Surgical History[4]   Family History[5]   Social History:   Short Social Hx on File[6]   Occ: student- law school   Exercise: 4 times per week- running and walking for 30 minurwa  Diet: doesn't watch   Allergies:  Allergies[7]     REVIEW OF SYSTEMS:   GENERAL: feels well otherwise  SKIN: denies any unusual skin lesions  EYES:denies blurred vision or double vision  HEENT: denies nasal congestion, sinus pain or ST  LUNGS: denies shortness of breath with exertion  CARDIOVASCULAR: denies chest pain on exertion  GI: denies abdominal pain,denies heartburn  : no abnormal discharge  MUSCULOSKELETAL: denies back pain  NEURO: denies headaches  PSYCHE: denies depression or anxiety  HEMATOLOGIC: denies hx of anemia  ENDOCRINE: denies thyroid history  ALL/ASTHMA: denies hx of allergy or asthma    EXAM:   /72   Pulse 91   Temp 98 °F (36.7 °C) (Temporal)   Resp 19   Ht 5' 1\" (1.549 m)   Wt 142 lb (64.4 kg)   LMP 06/05/2025 (Exact Date)   SpO2 98%   BMI 26.83 kg/m²   GENERAL: well developed, well nourished,in no apparent distress  SKIN: no rashes,no suspicious lesions  HEENT: atraumatic, normocephalic,ears and throat are clear  EYES:PERRLA, EOMI, normal optic disk,conjunctiva are clear  NECK: supple,no adenopathy,no bruits  CHEST: no chest tenderness  LUNGS: clear to  auscultation  CARDIO: RRR without murmur  GI: good BS's,no masses, HSM or tenderness  : deferred  MUSCULOSKELETAL: back is not tender,FROM of the back  EXTREMITIES: no cyanosis, clubbing or edema  NEURO: Oriented times three,cranial nerves are intact,motor and sensory are grossly intact    ASSESSMENT AND PLAN:   Adalgisa Duque is a 24 year old female who presents for a pre-operative physical exam.    1. Preop examination  2. BMI 26.0-26.9,adult  3. Side effect of medication  4. Anxiety  Patient is low risk for complications given age and no significant medical history.  She is not taking any supplements or OTC analgesics.  She has stopped her multivitamin in preparation for surgery.  She is only continued on her progesterone only pill.  She has no prior surgical history except for wisdom teeth extraction that was taken out under local anesthesia.  She has never undergone general anesthesia.  She has no cardiopulmonary history.  She is a non-smoker.      If labs are stable , she is cleared to proceed with surgery.      Deyanira Somers DO, 07/07/25, 9:49 AM           [1]   Current Outpatient Medications   Medication Sig Dispense Refill    Norethindrone (ORTHO MICRONOR) 0.35 MG Oral Tab Take 1 tablet (0.35 mg total) by mouth daily. 28 tablet 3    miSOPROStol (CYTOTEC) 200 MCG Oral Tab Insert 1 tablet into the vagina the night prior to the procedure. Insert the second tablet into the vagina the morning of the procedure. 2 tablet 0    clobetasol 0.05 % External Solution       Multiple Vitamins-Minerals (CENTRUM) Oral Tab Take 1 tablet by mouth in the morning.     [2]   Allergies  Allergen Reactions    [place] ITCHING, PAIN, RASH and SWELLING   [3]   Past Medical History:   Depression    Hx of motion sickness    Oral herpes    Patient denies medical problems   [4]   Past Surgical History:  Procedure Laterality Date    Oral surgery procedure      widsom tooth 06/2021    [5]   Family History  Problem Relation  Age of Onset    Thyroid disease Mother     Asthma Father     Diabetes Maternal Grandmother     Glaucoma Maternal Grandmother     Hypertension Maternal Grandmother     Diabetes Paternal Grandmother     Diabetes Paternal Grandfather    [6]   Social History  Socioeconomic History    Marital status: Significant Other   Tobacco Use    Smoking status: Never     Passive exposure: Never    Smokeless tobacco: Never   Vaping Use    Vaping status: Never Used   Substance and Sexual Activity    Alcohol use: Yes     Comment: Drinks probably less than once a month    Drug use: Yes     Types: Cannabis     Comment: occ    Sexual activity: Yes     Partners: Male     Birth control/protection: OCP, Condom   Other Topics Concern    Caffeine Concern No    Exercise No    Seat Belt No    Special Diet No    Stress Concern No    Weight Concern No   [7]   Allergies  Allergen Reactions    [place] ITCHING, PAIN, RASH and SWELLING

## 2025-07-09 NOTE — TELEPHONE ENCOUNTER
LOV 07/07/25: Pre-op exam.    Dr. Somers's notes:    1. Preop examination  If labs are stable , she is cleared to proceed with surgery.      Please review labs and addend office note stating pt. is okay to proceed with surgery.

## 2025-07-10 NOTE — DISCHARGE INSTRUCTIONS
Birth Control: IUD (Intrauterine Device)  The IUD (intrauterine device) is small, flexible, and T-shaped. A trained healthcare provider places it in the uterus. The IUD is one of the most effective birth control methods. It's also reversible. This means it can be removed at any time by a trained healthcare provider. New IUDs are safe and don't have the risks of older types of IUDs.     Pregnancy rates  Talk to your healthcare provider about the effectiveness of this birth control method.  Types of IUDs  IUD insertion is done in the healthcare provider’s office. Two types of IUDs are available:  The copper IUD releases a small amount of copper into the uterus. The copper makes it harder for sperm to reach the egg. The device works for at least 10 years.  The progestin IUD releases a hormone called progestin. It causes changes in the uterus to help prevent pregnancy. The device works for 3 to 8 years, depending on which device is chosen. It may be recommended for women who have anemia or heavy and painful periods.  IUDs have thin strings that hang from the opening of the uterus into the vagina. This lets you check that the IUD stays in place.   Things to know about IUDs  IUDs can be used by women who have never been pregnant or by women with a history of sexually transmitted infections (STIs) or tubal pregnancy.  It won't move from the uterus to any other part of the body.  There is a slight risk of the device coming out of the vagina (expulsion).  It may not work in women who have an abnormally shaped uterus.  A copper IUD may cause heavier periods and cramping.  A progestin IUD may cause light periods or no periods at all (irregular bleeding or spotting is possible and normal during the first 3 to 6 months).  If you get a sexually transmitted infection with an IUD in place, symptoms may be more severe.    What to report to your healthcare provider  Be sure your healthcare provider knows if you have:  A sexually  transmitted infection (STI) or possible STI  Liver problems  Blood clots (for progestin IUD only)  Breast cancer or a history of breast cancer (progestin IUD only)  Leila last reviewed this educational content on 12/1/2022  This information is for informational purposes only. This is not intended to be a substitute for professional medical advice, diagnosis, or treatment. Always seek the advice and follow the directions from your physician or other qualified health care provider.  © 9648-4916 [employer]. All rights reserved. This information is not intended as a substitute for professional medical care. Always follow your healthcare professional's instructions.    You received a drug called Toradol which is an Anti Inflammatory at: 12:40 pm   Do not take any Anti Inflammatory like Motrin, Advil, Aleve or Ibuprofen until after: 6:40 pm   Please report any suspected allergic reactions or bleeding issues to your doctor     Tylenol at 1:40 pm, next dose 7:40 pm

## 2025-07-10 NOTE — H&P
[facility]  Obstetrics and Gynecology  History & Physical    Adalgisa Duque Patient Status:  Hospital Outpatient Surgery    2000 MRN [id number]   Location [facility] SURGERY Attending Juliet Ho DO   Hospital Day 0 PCP Deyanira Somers DO     CC: Patient is here for scheduled HSC, D&C, and Paragard IUD insertion under US guidance    SUBJECTIVE:    Adalgisa Duque is a 24 year old  female who presents for scheduled HSC, D&C, and Paragard IUD insertion under US guidance 2/2 failed IUD insertion in clinic. The patient has no complaints today.    Obstetric History:   OB History    Para Term  AB Living   0 0 0 0 0 0   SAB IAB Ectopic Multiple Live Births   0 0 0 0 0     Past Medical History: Past Medical History[1]  Past Surgical History: Past Surgical History[2]  Family History: Family History[3]  Social History:   Social History     Tobacco Use    Smoking status: Never     Passive exposure: Never    Smokeless tobacco: Never   Substance Use Topics    Alcohol use: Yes     Comment: Drinks probably less than once a month       Home Meds: Prescriptions Prior to Admission[4]  Allergies: Allergies[5]    OBJECTIVE:    Temp:  [97.2 °F (36.2 °C)] 97.2 °F (36.2 °C)  Pulse:  [92] 92  Resp:  [16] 16  BP: (121)/(71) 121/71  SpO2:  [100 %] 100 %  Body mass index is 26.98 kg/m².    General: AAO. NAD.   Lungs: CTAB.   CV: RRR.  Abdomen: soft, nontender, nondistended, +BS  Gu: deferred to OR  Extremities: negative edema bilaterally, negative calf tenderness bilaterally, Bhavana's sign negative bilaterally     Labs:  25: 9.0>14.3<324, Cr 0.66, TSH 3.00    Imaging:  Pelvic US 2019  UTERUS:  5.35 cm x 2.71 cm x 3.60 cm    Endometrium Thickness: 0.49 cm    The uterus appears normal in size, shape, and echogenicity.  RIGHT OVARY:  2.81 cm x 1.74 cm x 2.29 cm    The right ovary appears normal in size, shape, and echogenicity. No significant masses are  identified.  LEFT OVARY:  Nonvisualization due to overlying bowel gas.  No adnexal mass.    CUL-DE-SAC:  Normal.  No fluid or mass.    OTHER:  Negative.       DOPPLER WAVE FORMS  FLOW:  There is normal arterial and venous Doppler wave forms in the right ovary.  The spectral analysis is within normal limits.  OTHER:  Negative.      Impression   CONCLUSION:    1. No acute findings.  Normal uterus, endometrium and right ovary.  Nonvisualization of the left ovary due to bowel gas.  No free fluid.          Dictated by: Ga Casas MD on 2019 at 6:57      Approved by: Ga Csaas MD on 2019 at 6:58         ASSESSMENT/ PLAN:    Adalgisa Duque is a 24 year old  female who presents for HSC, D&C, and Paragard IUD insertion under US guidance 2/2 failed IUD insertion in clinic    Problem List Items Addressed This Visit    None  Visit Diagnoses         Contraception, device intrauterine        Relevant Medications    lactated ringers infusion    Other Relevant Orders    US INTRAOPERATIVE GUIDANCE (CPT=76998)            - Admit for outpatient surgical procedure   - IVF: LR @ 100 cc/hr   - Diet: NPO  - Meds: no abx indicated per protocol   - VTE prophylaxis: SCDs, per protocol   - Anesthesia to evaluate   - Consent obtained and placed in chart   - Paragard IUD ordered to be placed in OR      Risks, benefits, alternatives and possible complications have been discussed in detail with the patient.  Pre-admission, admission, and post admission procedures and expectations were discussed in detail.  All questions answered, all appropriate consents will be signed at the Hospital. Previously stated procedure is planned for today and anticipated    Juliet Ho DO   [facility] - OBGYN      Note to patient and family   The 21st Century Cures Act makes medical notes available to patients in the interest of transparency.  However, please be advised that this is a medical document.  It is intended  as [id number]-tl-[id number] communication.  It is written and medical language may contain abbreviations or verbiage that are technical and unfamiliar.  It may appear blunt or direct.  Medical documents are intended to carry relevant information, facts as evident, and the clinical opinion of the practitioner.          [1]   Past Medical History:   Acute eczematoid otitis externa of right ear    Depression    Hx of motion sickness    Oral herpes    Patient denies medical problems   [2]   Past Surgical History:  Procedure Laterality Date    Oral surgery procedure      widsom tooth 06/2021    [3]   Family History  Problem Relation Age of Onset    Thyroid disease Mother     Asthma Father     Diabetes Maternal Grandmother     Glaucoma Maternal Grandmother     Hypertension Maternal Grandmother     Diabetes Paternal Grandmother     Diabetes Paternal Grandfather    [4]   Medications Prior to Admission   Medication Sig Dispense Refill Last Dose/Taking    Norethindrone (ORTHO MICRONOR) 0.35 MG Oral Tab Take 1 tablet (0.35 mg total) by mouth daily. 28 tablet 3 7/3/2025    miSOPROStol (CYTOTEC) 200 MCG Oral Tab Insert 1 tablet into the vagina the night prior to the procedure. Insert the second tablet into the vagina the morning of the procedure. 2 tablet 0 7/9/2025    clobetasol 0.05 % External Solution    7/3/2025    Multiple Vitamins-Minerals (CENTRUM) Oral Tab Take 1 tablet by mouth in the morning.   7/3/2025   [5]   Allergies  Allergen Reactions    [place] ITCHING, PAIN, RASH and SWELLING

## 2025-07-10 NOTE — OPERATIVE REPORT
OPERATIVE REPORT:   PARAGARD IUD INSERTION UNDER US-GUIDANCE PROCEDURE NOTE    PATIENT: Adalgisa Duque  MRN: [id number]  DATE OF PROCEDURE: 07/10/25    INDICATIONS:   Desired contraception via LARC and failed IUD insertion in clinic  PRE-OP DIAGNOSIS:   Desired LARC contraception   POST-OP DIAGNOSIS:   Same    PROCEDURE(S):   DILATATION   PARAGARD IUD INSERTION UNDER US-GUIDANCE    ANESTHESIA: MAC    SURGEON(S): Dr. Juliet Ho DO    ESTIMATED BLOOD LOSS: <5 mL           DRAINS: 175 mL clear yellow urine via straight catheter at end of procedure     SPECIMENS: none           IMPLANTS: Paragard IUD           COMPLICATIONS:  None    FINDINGS: Normal external genitalia, no lesions. Normal nulliparous cervix, no lesions. Anteverted uterus. Cervix dilated to 4 mm. Paragard IUD inserted to fundus via US-guidance. Good hemostasis.     PROCEDURE: The patient was counseled on various methods of contraception. The patient requested long acting reversible contraception with Paragard Intrauterine Device. The procedure, risks, benefits and alternatives were discussed with the patient. The patient was informed of risks including but not limited to the risk of bleeding, infection, injury, improper placement, pregnancy and irregular bleeding. All questions and concerns were addressed. The patient provided verbal and written consent. Attempt at IUD placement in clinic was unsuccessful, therefore decision made to proceed with IUD insertion in OR under sedation and US-guidance. Patient placed vaginal cytotec prior to procedure for additional cervical dilation.    The patient was taken to operative room and MAC anesthesia was initiated. She was placed in dorsal lithotomy position. She was prepped and draped in normal sterile fashion. The bladder was kept full for aid in placement and visualization via US. A sterile speculum was placed in the vagina. A single tooth tenaculum was used to grasp the anterior lip of the  cervix. The cervix was gently dilated with hegar dilators to 4 mm. The uterus was sounded to 6.5 cm. The Paragard Intrauterine Device was then prepped and loaded in sterile fashion. The Paragard Intrauterine Device was then inserted through the cervical canal into the uterine cavity under US guidance, deployed and the remaining device removed. The strings were visualized at the external os and cut to 2 cm. The tenaculum was removed. Good hemostasis was then noted. The Paragard IUD strings were then tucked behind the cervix and the all instruments were removed from the vagina. The bladder was emptied using a straight catheter.    Sponge, lap, needle, and instrument counts correct by two counts. The patient was taken to recovery room in stable condition. She was instructed to avoid anything in vagina for two weeks.       DISPOSITION:  To recovery room in stable condition        CONDITION: Stable  Complications: None    Follow up: 2 weeks       Note to patient and family   The 21st Century Cures Act makes medical notes available to patients in the interest of transparency.  However, please be advised that this is a medical document.  It is intended as [id number]-qzps communication.  It is written and medical language may contain abbreviations or verbiage that are technical and unfamiliar.  It may appear blunt or direct.  Medical documents are intended to carry relevant information, facts as evident, and the clinical opinion of the practitioner.    DO JULIANNE Hernandez - OBHAYLEY

## 2025-07-10 NOTE — ANESTHESIA PREPROCEDURE EVALUATION
PRE-OP EVALUATION    Patient Name: Adalgisa Duque    Admit Diagnosis: Contraception, device intrauterine [Z97.5]    Pre-op Diagnosis: Contraception, device intrauterine [Z97.5]    HYSTEROSCOPY DILATION AND CURETTAGE, Paraguard Intrauterine device insertion under ultrasound guidance    Anesthesia Procedure: HYSTEROSCOPY DILATION AND CURETTAGE, Paraguard Intrauterine device insertion under ultrasound guidance    Surgeons and Role:     * Juliet Ho, DO - Primary    Pre-op vitals reviewed.  Temp: 97.2 °F (36.2 °C)  Pulse: 92  Resp: 16  BP: 121/71  SpO2: 100 %  Body mass index is 26.98 kg/m².    Current medications reviewed.  Hospital Medications:  Current Medications[1]    Outpatient Medications:   Prescriptions Prior to Admission[2]    Allergies: [place]      Anesthesia Evaluation    Patient summary reviewed.    Anesthetic Complications  (-) history of anesthetic complications         GI/Hepatic/Renal    Negative GI/hepatic/renal ROS.                             Cardiovascular    Negative cardiovascular ROS.    Exercise tolerance: good     MET: >4                                           Endo/Other    Negative endo/other ROS.                              Pulmonary    Negative pulmonary ROS.                       Neuro/Psych      (+) depression  (+) anxiety           (+) headaches               Past Surgical History[3]  Social Hx on file[4]  History   Drug Use   • Types: Cannabis     Comment: occ     Available pre-op labs reviewed.  Lab Results   Component Value Date    WBC 9.0 07/07/2025    RBC 4.49 07/07/2025    HGB 14.3 07/07/2025    HCT 43.3 07/07/2025    MCV 96.4 07/07/2025    MCH 31.8 07/07/2025    MCHC 33.0 07/07/2025    RDW 11.6 07/07/2025     07/07/2025     Lab Results   Component Value Date     07/07/2025    K 4.4 07/07/2025     07/07/2025    CO2 23 07/07/2025    BUN 11 07/07/2025    CREATSERUM 0.66 07/07/2025    GLU 85 07/07/2025    CA 9.8 07/07/2025             Airway      Mallampati: II  Mouth opening: >3 FB  TM distance: > 6 cm   Cardiovascular    Cardiovascular exam normal.         Dental             Pulmonary    Pulmonary exam normal.                 Other findings        ASA: 1   Plan: MAC  NPO status verified and patient meets guidelines.          Plan/risks discussed with: patient            Present on Admission:  **None**               [1]  • [Transfer Hold] acetaminophen (Tylenol Extra Strength) tab 1,000 mg  1,000 mg Oral Once   • scopolamine (Transderm-Scop) 1 MG/3DAYS patch 1 patch  1 patch Transdermal Once   • lactated ringers infusion   Intravenous Continuous   [2]  Medications Prior to Admission   Medication Sig Dispense Refill Last Dose/Taking   • Norethindrone (ORTHO MICRONOR) 0.35 MG Oral Tab Take 1 tablet (0.35 mg total) by mouth daily. 28 tablet 3 7/3/2025   • miSOPROStol (CYTOTEC) 200 MCG Oral Tab Insert 1 tablet into the vagina the night prior to the procedure. Insert the second tablet into the vagina the morning of the procedure. 2 tablet 0 7/9/2025   • clobetasol 0.05 % External Solution    7/3/2025   • Multiple Vitamins-Minerals (CENTRUM) Oral Tab Take 1 tablet by mouth in the morning.   7/3/2025   [3]  Past Surgical History:  Procedure Laterality Date   • Oral surgery procedure      widsom tooth 06/2021    [4]  Social History  Socioeconomic History   • Marital status: Significant Other   Tobacco Use   • Smoking status: Never     Passive exposure: Never   • Smokeless tobacco: Never   Vaping Use   • Vaping status: Never Used   Substance and Sexual Activity   • Alcohol use: Yes     Comment: Drinks probably less than once a month   • Drug use: Yes     Types: Cannabis     Comment: occ   • Sexual activity: Yes     Partners: Male     Birth control/protection: OCP, Condom   Other Topics Concern   • Caffeine Concern No   • Exercise No   • Seat Belt No   • Special Diet No   • Stress Concern No   • Weight Concern No

## 2025-07-10 NOTE — ANESTHESIA POSTPROCEDURE EVALUATION
[facility]    Adalgisa Duque Patient Status:  Hospital Outpatient Surgery   Age/Gender 24 year old female MRN [id number]   Location [facility] PERIOPERATIVE SERVICE Attending Juliet Ho DO   Hosp Day # 0 PCP Deyanira Somers DO       Anesthesia Post-op Note    Paraguard Intrauterine device insertion under ultrasound guidance    Procedure Summary       Date: 07/10/25 Room / Location:  MAIN OR [address] [place] [address] MAIN OR    Anesthesia Start: 1216 Anesthesia Stop: 1248    Procedure: Paraguard Intrauterine device insertion under ultrasound guidance (Uterus) Diagnosis:       Contraception, device intrauterine      (Contraception, device intrauterine [Z97.5])    Surgeons: Juliet Ho DO Anesthesiologist: Kay Gil MD    Anesthesia Type: MAC ASA Status: 1            Anesthesia Type: MAC    Vitals Value Taken Time   /74 07/10/25 13:12   Temp 97.7 °F (36.5 °C) 07/10/25 12:48   Pulse 72 07/10/25 13:20   Resp 16 07/10/25 12:48   SpO2 100 % 07/10/25 13:20   Vitals shown include unfiled device data.        Patient Location: Same Day Surgery    Anesthesia Type: MAC    Airway Patency: patent    Postop Pain Control: adequate    Mental Status: preanesthetic baseline    Nausea/Vomiting: none    Cardiopulmonary/Hydration status: stable euvolemic    Complications: no apparent anesthesia related complications    Postop vital signs: stable    Dental Exam: Unchanged from Preop    Patient to be discharged home when criteria met.

## 2025-07-25 NOTE — PROGRESS NOTES
[facility]  Obstetrics and Gynecology  Follow Up Progress Note    Subjective:     Adalgisa Duque is a 24 year old  female who is s/p Paragard IUD insertion under US-guidance in OR on 07/10/2025 2/2 desired LARC and failed IUD insertion in clinic. The patient was recommended to return for 2 week follow up. The patient reports doing well. Had menses start a few days after IUD placed and she did note significantly heavier cycle, but manageable, and normal length. She denies any symptoms of palpitations, lightheadedness, SOB, CP, or HA. Has not had sexual intercourse since placement. Patient's last menstrual period was 2025 (exact date).    Review of Systems:  General: no complaints per category. See HPI for additional information.   Breast: no complaints per category. See HPI for additional information.   Respiratory: no complaints per category. See HPI for additional information.   Cardiovascular: no complaints per category. See HPI for additional information.   GI: no complaints per category. See HPI for additional information.   : no complaints per category. See HPI for additional information.   Heme: no complaints per category. See HPI for additional information.       Objective:     Vitals:    25 1502   BP: 104/66   Pulse: 98   Weight: 138 lb 8 oz (62.8 kg)   Height: 61\"         Body mass index is 26.17 kg/m².    General: AAO.NAD.   CV: normal peripheral perfusion   Lungs: no tachypnea, non-labored breathing   Abdominal exam: soft, nontender, nondistended  Pelvic exam:   VULVA: normal appearing vulva with no masses, tenderness or lesions  PERINEUM:  normal appearing, no lesions   URETHRAL MEATUS:  normal appearing, no lesions   VAGINA: normal appearing vagina with normal color and discharge, no lesions  CERVIX: normal appearing cervix without discharge or lesions.  2 IUD strings noted.   UTERUS: uterus is normal size, shape, consistency and nontender  ADNEXA: normal  adnexa in size, nontender and no masses  PERIRECTAL:  normal appearing, no lesions   Ext: non-tender, no edema        Assessment:     Adalgisa Duque is a 24 year old  female who presents for follow up after IUD insertion     Problem List[1]      Plan:     Paragard IUD in situ  - placed on 07/10/2025  - recommend to remove by 3025  - IUD strings visualized and palpable  - advised continue IUD string check per patient   - AUB precautions provided     All of the findings and plan were discussed with the patient.  She notes understanding and agrees with the plan of care.  All questions were answered to the best of my ability at this time.      Total patient time was 15 minutes in evaluation, consultation, and coordination of care. This included face to face and non-face to face actions. The patient's questions and concerns were addressed.     RTC in 1 year or sooner if needed     Juliet Ho DO  [facility] - OBGYN       Discussed with patient that there will not be further notification of normal or benign results other than receiving results on [employer]. A [employer] message or telephone call will be placed by the physician and/or office staff if results are abnormal.     Note to patient and family   The 21st Century Cures Act makes medical notes available to patients in the interest of transparency.  However, please be advised that this is a medical document.  It is intended as apup-ng-vppn communication.  It is written and medical language may contain abbreviations or verbiage that are technical and unfamiliar.  It may appear blunt or direct.  Medical documents are intended to carry relevant information, facts as evident, and the clinical opinion of the practitioner.        This note could include assistance by Dragon voice recognition. Errors in content may be related to improper recognition by the system; efforts to review and correct have been done but errors may still exist.         [1]    Patient Active Problem List  Diagnosis    Intrinsic atopic dermatitis    Family history of breast cancer    Mild depression    Family history of hyperlipidemia    BMI 26.0-26.9,adult    Acute eczematoid otitis externa of right ear    Anxiety    Tension headache    Side effect of medication    Contraception, device intrauterine

## 2025-07-25 NOTE — PATIENT INSTRUCTIONS
Birth Control: IUD (Intrauterine Device)  The IUD (intrauterine device) is small, flexible, and T-shaped. A trained healthcare provider places it in the uterus. The IUD is one of the most effective birth control methods. It's also reversible. This means it can be removed at any time by a trained healthcare provider. New IUDs are safe and don't have the risks of older types of IUDs.     Pregnancy rates  Talk to your healthcare provider about the effectiveness of this birth control method.  Types of IUDs  IUD insertion is done in the healthcare provider’s office. Two types of IUDs are available:  The copper IUD releases a small amount of copper into the uterus. The copper makes it harder for sperm to reach the egg. The device works for at least 10 years.  The progestin IUD releases a hormone called progestin. It causes changes in the uterus to help prevent pregnancy. The device works for 3 to 8 years, depending on which device is chosen. It may be recommended for women who have anemia or heavy and painful periods.  IUDs have thin strings that hang from the opening of the uterus into the vagina. This lets you check that the IUD stays in place.   Things to know about IUDs  IUDs can be used by women who have never been pregnant or by women with a history of sexually transmitted infections (STIs) or tubal pregnancy.  It won't move from the uterus to any other part of the body.  There is a slight risk of the device coming out of the vagina (expulsion).  It may not work in women who have an abnormally shaped uterus.  A copper IUD may cause heavier periods and cramping.  A progestin IUD may cause light periods or no periods at all (irregular bleeding or spotting is possible and normal during the first 3 to 6 months).  If you get a sexually transmitted infection with an IUD in place, symptoms may be more severe.    What to report to your healthcare provider  Be sure your healthcare provider knows if you have:  A sexually  transmitted infection (STI) or possible STI  Liver problems  Blood clots (for progestin IUD only)  Breast cancer or a history of breast cancer (progestin IUD only)  Leila last reviewed this educational content on 12/1/2022  This information is for informational purposes only. This is not intended to be a substitute for professional medical advice, diagnosis, or treatment. Always seek the advice and follow the directions from your physician or other qualified health care provider.  © 7463-5556 [employer]. All rights reserved. This information is not intended as a substitute for professional medical care. Always follow your healthcare professional's instructions.

## (undated) NOTE — ED AVS SNAPSHOT
Fareed Anderson   MRN: [id number]    Department:  [facility] Emergency Department   Date of Visit:  12/20/2019           Disclosure     Insurance plans vary and the physician(s) referred by the ER may not be covered by your plan.  Please tell this physician (or your personal doctor if your instructions are to return to your personal doctor) about any new or lasting problems. The primary care or specialist physician will see patients referred from the [facility] Emergency Department.  Catracho Gutiérrez

## (undated) NOTE — LETTER
OUTSIDE TESTING RESULT REQUEST     IMPORTANT: FOR YOUR IMMEDIATE ATTENTION  Please FAX all test results listed below to: [phone]     Testing already done on or about: ASAP    * * * * If testing is NOT complete, arrange with patient A.S.A.P. * * * *      Patient Name: Adalgisa Duque  Surgery Date: 7/10/2025  Medical Record: [id number]  CSN: [id number]  : 2000 - A: 24 y     Sex: female  Surgeon(s):  Juliet Ho DO  Procedure: HYSTEROSCOPY DILATION AND CURETTAGE, Paraguard IUD insertion under ultrasound guidance  Anesthesia Type: MAC     Surgeon: Juliet Ho DO     The following Testing and Time Line are REQUIRED PER ANESTHESIA     CBC, Platelet; NO Differential within  30 days      Thank You,   Sent by:ASHISH FRANCIS

## (undated) NOTE — LETTER
12/31/19        [facility] [facility] [place]  [address]            [place]        This is a follow up letter to let you know we have tried to reach you regarding your recent ED visit on 12/20/2019.  We would like to make sure your s